# Patient Record
Sex: FEMALE | Race: WHITE | NOT HISPANIC OR LATINO
[De-identification: names, ages, dates, MRNs, and addresses within clinical notes are randomized per-mention and may not be internally consistent; named-entity substitution may affect disease eponyms.]

---

## 2023-05-31 PROBLEM — Z00.00 ENCOUNTER FOR PREVENTIVE HEALTH EXAMINATION: Status: ACTIVE | Noted: 2023-05-31

## 2023-06-02 ENCOUNTER — RESULT CHARGE (OUTPATIENT)
Age: 48
End: 2023-06-02

## 2023-06-02 ENCOUNTER — OUTPATIENT (OUTPATIENT)
Dept: OUTPATIENT SERVICES | Facility: HOSPITAL | Age: 48
LOS: 1 days | End: 2023-06-02
Payer: MEDICAID

## 2023-06-02 ENCOUNTER — APPOINTMENT (OUTPATIENT)
Dept: OBGYN | Facility: CLINIC | Age: 48
End: 2023-06-02
Payer: MEDICAID

## 2023-06-02 VITALS
HEART RATE: 94 BPM | WEIGHT: 195 LBS | SYSTOLIC BLOOD PRESSURE: 127 MMHG | HEIGHT: 66 IN | BODY MASS INDEX: 31.34 KG/M2 | DIASTOLIC BLOOD PRESSURE: 84 MMHG

## 2023-06-02 DIAGNOSIS — Z34.90 ENCOUNTER FOR SUPERVISION OF NORMAL PREGNANCY, UNSPECIFIED, UNSPECIFIED TRIMESTER: ICD-10-CM

## 2023-06-02 PROCEDURE — 99203 OFFICE O/P NEW LOW 30 MIN: CPT | Mod: 25

## 2023-06-02 PROCEDURE — 99203 OFFICE O/P NEW LOW 30 MIN: CPT

## 2023-06-02 PROCEDURE — 76815 OB US LIMITED FETUS(S): CPT

## 2023-06-02 PROCEDURE — 76815 OB US LIMITED FETUS(S): CPT | Mod: 26

## 2023-06-02 PROCEDURE — 81002 URINALYSIS NONAUTO W/O SCOPE: CPT

## 2023-06-02 RX ORDER — PRENATAL VIT NO.126/IRON/FOLIC 28MG-0.8MG
28-0.8 TABLET ORAL
Qty: 90 | Refills: 3 | Status: ACTIVE | COMMUNITY
Start: 2023-06-02 | End: 1900-01-01

## 2023-06-05 DIAGNOSIS — Z34.93 ENCOUNTER FOR SUPERVISION OF NORMAL PREGNANCY, UNSPECIFIED, THIRD TRIMESTER: ICD-10-CM

## 2023-06-06 LAB
BILIRUB UR QL STRIP: NEGATIVE
CLARITY UR: CLEAR
COLLECTION METHOD: NORMAL
GLUCOSE UR-MCNC: NEGATIVE
HCG UR QL: 0.2 EU/DL
HGB UR QL STRIP.AUTO: NEGATIVE
KETONES UR-MCNC: NEGATIVE
LEUKOCYTE ESTERASE UR QL STRIP: NEGATIVE
NITRITE UR QL STRIP: NEGATIVE
PH UR STRIP: 6
PROT UR STRIP-MCNC: NEGATIVE
SP GR UR STRIP: 1.02

## 2023-06-07 ENCOUNTER — APPOINTMENT (OUTPATIENT)
Dept: ANTEPARTUM | Facility: CLINIC | Age: 48
End: 2023-06-07
Payer: MEDICAID

## 2023-06-07 ENCOUNTER — OUTPATIENT (OUTPATIENT)
Dept: OUTPATIENT SERVICES | Facility: HOSPITAL | Age: 48
LOS: 1 days | End: 2023-06-07
Payer: MEDICAID

## 2023-06-07 ENCOUNTER — ASOB RESULT (OUTPATIENT)
Age: 48
End: 2023-06-07

## 2023-06-07 DIAGNOSIS — Z34.90 ENCOUNTER FOR SUPERVISION OF NORMAL PREGNANCY, UNSPECIFIED, UNSPECIFIED TRIMESTER: ICD-10-CM

## 2023-06-07 DIAGNOSIS — O09.93 SUPERVISION OF HIGH RISK PREGNANCY, UNSPECIFIED, THIRD TRIMESTER: ICD-10-CM

## 2023-06-07 PROCEDURE — 76819 FETAL BIOPHYS PROFIL W/O NST: CPT

## 2023-06-07 PROCEDURE — 76811 OB US DETAILED SNGL FETUS: CPT | Mod: 26

## 2023-06-07 PROCEDURE — 76819 FETAL BIOPHYS PROFIL W/O NST: CPT | Mod: 26

## 2023-06-07 PROCEDURE — 99212 OFFICE O/P EST SF 10 MIN: CPT | Mod: 25

## 2023-06-07 PROCEDURE — 76811 OB US DETAILED SNGL FETUS: CPT

## 2023-06-07 PROCEDURE — 99212 OFFICE O/P EST SF 10 MIN: CPT

## 2023-06-07 PROCEDURE — ZZZZZ: CPT

## 2023-06-08 ENCOUNTER — OUTPATIENT (OUTPATIENT)
Dept: OUTPATIENT SERVICES | Facility: HOSPITAL | Age: 48
LOS: 1 days | End: 2023-06-08
Payer: MEDICAID

## 2023-06-08 DIAGNOSIS — O09.523 SUPERVISION OF ELDERLY MULTIGRAVIDA, THIRD TRIMESTER: ICD-10-CM

## 2023-06-08 DIAGNOSIS — O28.9 UNSPECIFIED ABNORMAL FINDINGS ON ANTENATAL SCREENING OF MOTHER: ICD-10-CM

## 2023-06-08 DIAGNOSIS — O99.213 OBESITY COMPLICATING PREGNANCY, THIRD TRIMESTER: ICD-10-CM

## 2023-06-08 DIAGNOSIS — Z3A.29 29 WEEKS GESTATION OF PREGNANCY: ICD-10-CM

## 2023-06-08 DIAGNOSIS — O35.8XX0 MATERNAL CARE FOR OTHER (SUSPECTED) FETAL ABNORMALITY AND DAMAGE, NOT APPLICABLE OR UNSPECIFIED: ICD-10-CM

## 2023-06-08 DIAGNOSIS — Z34.93 ENCOUNTER FOR SUPERVISION OF NORMAL PREGNANCY, UNSPECIFIED, THIRD TRIMESTER: ICD-10-CM

## 2023-06-08 DIAGNOSIS — O09.93 SUPERVISION OF HIGH RISK PREGNANCY, UNSPECIFIED, THIRD TRIMESTER: ICD-10-CM

## 2023-06-08 DIAGNOSIS — O28.5 ABNORMAL CHROMOSOMAL AND GENETIC FINDING ON ANTENATAL SCREENING OF MOTHER: ICD-10-CM

## 2023-06-08 DIAGNOSIS — O99.210 OBESITY COMPLICATING PREGNANCY, UNSPECIFIED TRIMESTER: ICD-10-CM

## 2023-06-08 PROCEDURE — 85027 COMPLETE CBC AUTOMATED: CPT

## 2023-06-08 PROCEDURE — 36415 COLL VENOUS BLD VENIPUNCTURE: CPT

## 2023-06-08 PROCEDURE — 86900 BLOOD TYPING SEROLOGIC ABO: CPT

## 2023-06-08 PROCEDURE — 86901 BLOOD TYPING SEROLOGIC RH(D): CPT

## 2023-06-08 PROCEDURE — 86850 RBC ANTIBODY SCREEN: CPT

## 2023-06-08 PROCEDURE — 82950 GLUCOSE TEST: CPT

## 2023-06-09 ENCOUNTER — NON-APPOINTMENT (OUTPATIENT)
Age: 48
End: 2023-06-09

## 2023-06-09 DIAGNOSIS — Z34.93 ENCOUNTER FOR SUPERVISION OF NORMAL PREGNANCY, UNSPECIFIED, THIRD TRIMESTER: ICD-10-CM

## 2023-06-13 ENCOUNTER — NON-APPOINTMENT (OUTPATIENT)
Age: 48
End: 2023-06-13

## 2023-06-13 LAB
ABO + RH PNL BLD: NORMAL
BLD GP AB SCN SERPL QL: NORMAL

## 2023-06-14 ENCOUNTER — NON-APPOINTMENT (OUTPATIENT)
Age: 48
End: 2023-06-14

## 2023-06-14 LAB — GLUCOSE 1H P 50 G GLC PO SERPL-MCNC: 179 MG/DL

## 2023-06-16 ENCOUNTER — OUTPATIENT (OUTPATIENT)
Dept: OUTPATIENT SERVICES | Facility: HOSPITAL | Age: 48
LOS: 1 days | End: 2023-06-16
Payer: MEDICAID

## 2023-06-16 ENCOUNTER — APPOINTMENT (OUTPATIENT)
Dept: OBGYN | Facility: CLINIC | Age: 48
End: 2023-06-16
Payer: MEDICAID

## 2023-06-16 VITALS — BODY MASS INDEX: 33.41 KG/M2 | WEIGHT: 207 LBS | DIASTOLIC BLOOD PRESSURE: 70 MMHG | SYSTOLIC BLOOD PRESSURE: 124 MMHG

## 2023-06-16 DIAGNOSIS — Z34.90 ENCOUNTER FOR SUPERVISION OF NORMAL PREGNANCY, UNSPECIFIED, UNSPECIFIED TRIMESTER: ICD-10-CM

## 2023-06-16 PROCEDURE — 99213 OFFICE O/P EST LOW 20 MIN: CPT

## 2023-06-16 PROCEDURE — 81002 URINALYSIS NONAUTO W/O SCOPE: CPT

## 2023-06-21 DIAGNOSIS — Z34.93 ENCOUNTER FOR SUPERVISION OF NORMAL PREGNANCY, UNSPECIFIED, THIRD TRIMESTER: ICD-10-CM

## 2023-06-24 ENCOUNTER — OUTPATIENT (OUTPATIENT)
Dept: OUTPATIENT SERVICES | Facility: HOSPITAL | Age: 48
LOS: 1 days | End: 2023-06-24
Payer: MEDICAID

## 2023-06-24 DIAGNOSIS — Z34.93 ENCOUNTER FOR SUPERVISION OF NORMAL PREGNANCY, UNSPECIFIED, THIRD TRIMESTER: ICD-10-CM

## 2023-06-24 PROCEDURE — 82652 VIT D 1 25-DIHYDROXY: CPT

## 2023-06-24 PROCEDURE — 36415 COLL VENOUS BLD VENIPUNCTURE: CPT

## 2023-06-25 DIAGNOSIS — Z34.93 ENCOUNTER FOR SUPERVISION OF NORMAL PREGNANCY, UNSPECIFIED, THIRD TRIMESTER: ICD-10-CM

## 2023-06-29 ENCOUNTER — APPOINTMENT (OUTPATIENT)
Dept: ANTEPARTUM | Facility: CLINIC | Age: 48
End: 2023-06-29

## 2023-06-30 ENCOUNTER — NON-APPOINTMENT (OUTPATIENT)
Age: 48
End: 2023-06-30

## 2023-06-30 ENCOUNTER — APPOINTMENT (OUTPATIENT)
Dept: ANTEPARTUM | Facility: CLINIC | Age: 48
End: 2023-06-30
Payer: COMMERCIAL

## 2023-06-30 ENCOUNTER — OUTPATIENT (OUTPATIENT)
Dept: OUTPATIENT SERVICES | Facility: HOSPITAL | Age: 48
LOS: 1 days | End: 2023-06-30
Payer: COMMERCIAL

## 2023-06-30 ENCOUNTER — APPOINTMENT (OUTPATIENT)
Dept: OBGYN | Facility: CLINIC | Age: 48
End: 2023-06-30
Payer: COMMERCIAL

## 2023-06-30 ENCOUNTER — RESULT CHARGE (OUTPATIENT)
Age: 48
End: 2023-06-30

## 2023-06-30 VITALS
HEART RATE: 91 BPM | SYSTOLIC BLOOD PRESSURE: 134 MMHG | WEIGHT: 208 LBS | HEIGHT: 66 IN | BODY MASS INDEX: 33.43 KG/M2 | DIASTOLIC BLOOD PRESSURE: 76 MMHG

## 2023-06-30 DIAGNOSIS — Z34.90 ENCOUNTER FOR SUPERVISION OF NORMAL PREGNANCY, UNSPECIFIED, UNSPECIFIED TRIMESTER: ICD-10-CM

## 2023-06-30 DIAGNOSIS — Z34.93 ENCOUNTER FOR SUPERVISION OF NORMAL PREGNANCY, UNSPECIFIED, THIRD TRIMESTER: ICD-10-CM

## 2023-06-30 PROCEDURE — 57454 BX/CURETT OF CERVIX W/SCOPE: CPT

## 2023-06-30 PROCEDURE — 87086 URINE CULTURE/COLONY COUNT: CPT

## 2023-06-30 PROCEDURE — 99213 OFFICE O/P EST LOW 20 MIN: CPT

## 2023-06-30 PROCEDURE — 81002 URINALYSIS NONAUTO W/O SCOPE: CPT

## 2023-06-30 PROCEDURE — 81025 URINE PREGNANCY TEST: CPT

## 2023-06-30 RX ORDER — CEPHALEXIN 500 MG/1
500 CAPSULE ORAL
Qty: 14 | Refills: 0 | Status: ACTIVE | COMMUNITY
Start: 2023-06-30 | End: 1900-01-01

## 2023-06-30 RX ORDER — LANCETS 33 GAUGE
EACH MISCELLANEOUS
Qty: 120 | Refills: 4 | Status: ACTIVE | COMMUNITY
Start: 2023-06-30 | End: 1900-01-01

## 2023-06-30 RX ORDER — ISOPROPYL ALCOHOL 70 ML/100ML
SWAB TOPICAL
Qty: 200 | Refills: 3 | Status: ACTIVE | COMMUNITY
Start: 2023-06-30 | End: 1900-01-01

## 2023-06-30 RX ORDER — BLOOD-GLUCOSE METER
W/DEVICE EACH MISCELLANEOUS
Qty: 1 | Refills: 0 | Status: ACTIVE | COMMUNITY
Start: 2023-06-30 | End: 1900-01-01

## 2023-06-30 RX ORDER — BLOOD SUGAR DIAGNOSTIC
STRIP MISCELLANEOUS
Qty: 120 | Refills: 2 | Status: ACTIVE | COMMUNITY
Start: 2023-06-30 | End: 1900-01-01

## 2023-07-01 LAB
24R-OH-CALCIDIOL SERPL-MCNC: 114 PG/ML
BILIRUB UR QL STRIP: NEGATIVE
CLARITY UR: CLEAR
COLLECTION METHOD: NORMAL
GLUCOSE UR-MCNC: NEGATIVE
HCG UR QL: 0.2 EU/DL
HGB UR QL STRIP.AUTO: NEGATIVE
KETONES UR-MCNC: NEGATIVE
LEUKOCYTE ESTERASE UR QL STRIP: NEGATIVE
NITRITE UR QL STRIP: NEGATIVE
PH UR STRIP: 6
PROT UR STRIP-MCNC: NEGATIVE
SP GR UR STRIP: 1.02

## 2023-07-03 ENCOUNTER — OUTPATIENT (OUTPATIENT)
Dept: OUTPATIENT SERVICES | Facility: HOSPITAL | Age: 48
LOS: 1 days | End: 2023-07-03
Payer: MEDICAID

## 2023-07-03 ENCOUNTER — RESULT CHARGE (OUTPATIENT)
Age: 48
End: 2023-07-03

## 2023-07-03 ENCOUNTER — APPOINTMENT (OUTPATIENT)
Dept: ANTEPARTUM | Facility: CLINIC | Age: 48
End: 2023-07-03

## 2023-07-03 ENCOUNTER — APPOINTMENT (OUTPATIENT)
Dept: ANTEPARTUM | Facility: CLINIC | Age: 48
End: 2023-07-03
Payer: MEDICAID

## 2023-07-03 ENCOUNTER — NON-APPOINTMENT (OUTPATIENT)
Age: 48
End: 2023-07-03

## 2023-07-03 ENCOUNTER — ASOB RESULT (OUTPATIENT)
Age: 48
End: 2023-07-03

## 2023-07-03 ENCOUNTER — APPOINTMENT (OUTPATIENT)
Dept: OBGYN | Facility: CLINIC | Age: 48
End: 2023-07-03

## 2023-07-03 VITALS
DIASTOLIC BLOOD PRESSURE: 92 MMHG | BODY MASS INDEX: 33.57 KG/M2 | SYSTOLIC BLOOD PRESSURE: 140 MMHG | OXYGEN SATURATION: 96 % | TEMPERATURE: 98 F | HEART RATE: 90 BPM | WEIGHT: 208 LBS

## 2023-07-03 VITALS — SYSTOLIC BLOOD PRESSURE: 137 MMHG | DIASTOLIC BLOOD PRESSURE: 83 MMHG | HEART RATE: 84 BPM | OXYGEN SATURATION: 97 %

## 2023-07-03 DIAGNOSIS — Z34.90 ENCOUNTER FOR SUPERVISION OF NORMAL PREGNANCY, UNSPECIFIED, UNSPECIFIED TRIMESTER: ICD-10-CM

## 2023-07-03 DIAGNOSIS — O28.0 ABNORMAL HEMATOLOGICAL FINDING ON ANTENATAL SCREENING OF MOTHER: ICD-10-CM

## 2023-07-03 DIAGNOSIS — R89.4 ABNORMAL IMMUNOLOGICAL FINDINGS IN SPECIMENS FROM OTHER ORGANS, SYSTEMS AND TISSUES: ICD-10-CM

## 2023-07-03 DIAGNOSIS — Z3A.32 32 WEEKS GESTATION OF PREGNANCY: ICD-10-CM

## 2023-07-03 DIAGNOSIS — O09.90 SUPERVISION OF HIGH RISK PREGNANCY, UNSPECIFIED, UNSPECIFIED TRIMESTER: ICD-10-CM

## 2023-07-03 DIAGNOSIS — O24.419 GESTATIONAL DIABETES MELLITUS IN PREGNANCY, UNSPECIFIED CONTROL: ICD-10-CM

## 2023-07-03 DIAGNOSIS — O09.93 SUPERVISION OF HIGH RISK PREGNANCY, UNSPECIFIED, THIRD TRIMESTER: ICD-10-CM

## 2023-07-03 LAB
BILIRUB UR QL STRIP: NEGATIVE
CLARITY UR: CLEAR
COLLECTION METHOD: NORMAL
FETAL HEART DESCRIPTION: NORMAL
FETAL HEART DESCRIPTION: NORMAL
FETAL HEART RATE (BPM): 129
FETAL HEART RATE (BPM): 140
FETAL HEART RATE (BPM): 166
FETAL MOVEMENT: PRESENT
GLUCOSE BLDC GLUCOMTR-MCNC: 104
GLUCOSE BLDC GLUCOMTR-MCNC: 104 MG/DL — HIGH (ref 70–99)
GLUCOSE UR-MCNC: NEGATIVE
HCG UR QL: 0.2 EU/DL
HGB UR QL STRIP.AUTO: NEGATIVE
KETONES UR-MCNC: NEGATIVE
LEUKOCYTE ESTERASE UR QL STRIP: NEGATIVE
NITRITE UR QL STRIP: NEGATIVE
OB COMMENTS: NORMAL
PH UR STRIP: 5
PROT UR STRIP-MCNC: NEGATIVE
SCHEDULED VISIT: YES
SP GR UR STRIP: 1.01
URINE ALBUMIN/PROTEIN: NEGATIVE
URINE GLUCOSE: NEGATIVE
URINE KETONES: NEGATIVE
WEEKS GESTATION: 32.6

## 2023-07-03 PROCEDURE — 76816 OB US FOLLOW-UP PER FETUS: CPT | Mod: 26

## 2023-07-03 PROCEDURE — 76816 OB US FOLLOW-UP PER FETUS: CPT

## 2023-07-03 PROCEDURE — 76819 FETAL BIOPHYS PROFIL W/O NST: CPT | Mod: 26,59

## 2023-07-03 PROCEDURE — 99204 OFFICE O/P NEW MOD 45 MIN: CPT

## 2023-07-03 PROCEDURE — 76819 FETAL BIOPHYS PROFIL W/O NST: CPT

## 2023-07-03 PROCEDURE — G0108: CPT

## 2023-07-03 PROCEDURE — 99214 OFFICE O/P EST MOD 30 MIN: CPT | Mod: 25

## 2023-07-03 PROCEDURE — 82962 GLUCOSE BLOOD TEST: CPT

## 2023-07-03 NOTE — DISCUSSION/SUMMARY
[FreeTextEntry1] : [facility] Staff\par \par Ms. Amador is a 47yo  at 32w6d GA LEBRON 23 by sonogram presenting for consultation for GDM, AMA with elevated msAFP. 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 Anti-M antibodies noted in records from 3/2023, negative antibody screen on repeat. She originally received prenatal care in [place].  Currently managed by Dr. Lester.\par \par Patient is doing well. She denies contractions, leakage of fluid, vaginal bleeding. Endorses good fetal movement. \par Reports she has never had diabetes inside or outside of pregnancy before but she feels as though her diet has not been as good this pregnancy.  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 Has kittens that live in the basement but do not come upstairs or anywhere near her. denies, smoking, alcohol or drug use\par Psych: denies\par Genetics: denies h/o genetic abnormalities\par \par Physical Exam: BP: 140/92-->137/83 , HR: 90 bpm, O2sat: 96 %, Wt: 208 lbs, Temp: 98 F,\par Udip: negative \par GA: AOx3, NAD\par Heart: RRR, no M/R/G\par Lungs: CTAB\par Abd: soft, nontender, nondistended\par LE: no erythema, edema or pain\par \par Labs: NIPT low risk, msAFP 3 MoM\par  80/206/182/64\par  B positive, antibody screen negative\par  \par 3/8 VDRL negative, HepBsAg NR, varicella immune, rubella immune, carrier of usher syndrome, msAFP 3 MoM, B positive, postivie antibody screen anti-M, HIV NR, high avidity CMV IgG\par \par OBUS\par  29w1d 1497gm EFW 70%ile, MVP 7.08cm, vtx, left lateral placenta no previa, central cord insertion, BPP , normal anatomy\par \par A/P Ms. Amador is a 47yo  at 32w6d GA LEBRON 23 by sonogram presenting for consultation for GDM, AMA with elevated msAFP. Currently managed by Dr. Lester.\par \par #AMA, elevated msAFP at 3 MoM\par - normal anatomy scan\par - previously declined amniocentesis\par \par #GDM\par Counseling:  \par 1. The patient will be evaluated by a nutritionist and instructed in adhering to a diabetic diet. \par 2. She was counseled by a nurse and instructed in capillary blood glucose testing (fasting and 2 hours after each meal). \par 3. The significance and usual management of diabetes in pregnancy were reviewed, including risks of preeclampsia, Intra-Uterine Fetal Demise, macrosomia, birth trauma, pre-term labor,  section, NICU admission (the main reasons include  hypoglycemia and  jaundice) and the possible need for insulin therapy. \par 4. Exercise was encouraged. Ideally, she should walk briskly after each meal. \par \par Recommendations: \par 1. Glycemic Control. Target glucose ranges to minimize excessive fetal growth are: Fasting 60-90 mg/dl; and 2-hour postprandial < 120 mg/dl. If these values are elevated, insulin therapy is encouraged. Patient encouraged to maintain fingerstick log. \par 2. Antepartum testing. Daily fetal movement counts are recommended from 28 weeks. Weekly or twice weekly biophysical profiles may be recommended, the frequency and timing will depend on glucose control. Ultrasound assessment of fetal growth and macrosomic features is recommended. \par 3. Timing of Delivery. To be determined \par 4. Route of delivery. Plan for .   Diabetes is associated with about a six-fold risk for shoulder dystocia (which includes the risk of irreversible nerve, bone, and brain injury).  Operative vaginal deliveries should be approached with caution if at all.\par 5. Glucose control in labor. During labor, capillary glucose values should be checked every few hours (depending on their stability). Insulin may be required to cover elevated glucose levels. \par 6. Long-term diabetes surveillance. The risk of developing diabetes outside of pregnancy over the next five years may be as high as 50%. I recommend that she have a 2 hour GTT or similar diabetes screen at 6 to 12 weeks postpartum with her primary Obstetrician and counseling about ways to minimize her risk. If her fasting glucose is normal, annual glucose screening by her primary care physician is advised. \par \par - weekly BPP and monthly growth scans for the time being.\par \par Diabetes supplies previously sent to pharmacy. \par \par #Anti-M Antibody\par - Negative antibody screen , \par \par #Usher Syndrome Carrier\par - Patient has 4 healthy living children with same partner, amenable to genetic testing but reports  has not been available during pregnancy due to frequent travel\par - Amenable to genetic counselling, advised to schedule an appointment\par \par #Elevated BP in Office\par - Patient denies history of elevated BPs, currently asymptomatic, repeat non-elevated\par - Advised patient to monitor blood pressures at home and to return with BP log\par \par #Pregnancy \par - Care per Dr. Lester\par - Lives at home with cats, declines testing for toxoplasma.\par \par Hypoglycemia, fetal movement, and labor precautions discussed,\par Follow up in around one week with the fingerstick log  and the blood pressure log.\par \par \par  declines

## 2023-07-03 NOTE — END OF VISIT
[FreeTextEntry3] : [facility] Staff\par \par I saw and evaluated Ms. SAMPSON with Dr. Ta and I agree with the documentation above.   I modified the note above (if indicated) and agree with its contents in the present form.\par \par GDM\par - Check fingersticks\par \par Elevated blood pressure once\par - Record blood pressure log\par \par Carrier for Usher syndrome\par - Follow up with genetic counseling.\par \par Follow up in one week.\par \par Liu Kwon MD\par \par \par \par \par

## 2023-07-05 DIAGNOSIS — R89.4 ABNORMAL IMMUNOLOGICAL FINDINGS IN SPECIMENS FROM OTHER ORGANS, SYSTEMS AND TISSUES: ICD-10-CM

## 2023-07-05 DIAGNOSIS — Z3A.32 32 WEEKS GESTATION OF PREGNANCY: ICD-10-CM

## 2023-07-05 DIAGNOSIS — O24.419 GESTATIONAL DIABETES MELLITUS IN PREGNANCY, UNSPECIFIED CONTROL: ICD-10-CM

## 2023-07-05 DIAGNOSIS — O28.0 ABNORMAL HEMATOLOGICAL FINDING ON ANTENATAL SCREENING OF MOTHER: ICD-10-CM

## 2023-07-05 LAB — BACTERIA UR CULT: NORMAL

## 2023-07-07 ENCOUNTER — NON-APPOINTMENT (OUTPATIENT)
Age: 48
End: 2023-07-07

## 2023-07-11 ENCOUNTER — APPOINTMENT (OUTPATIENT)
Dept: ANTEPARTUM | Facility: CLINIC | Age: 48
End: 2023-07-11
Payer: MEDICAID

## 2023-07-11 ENCOUNTER — APPOINTMENT (OUTPATIENT)
Dept: OBGYN | Facility: CLINIC | Age: 48
End: 2023-07-11
Payer: MEDICAID

## 2023-07-11 ENCOUNTER — RESULT CHARGE (OUTPATIENT)
Age: 48
End: 2023-07-11

## 2023-07-11 ENCOUNTER — OUTPATIENT (OUTPATIENT)
Dept: OUTPATIENT SERVICES | Facility: HOSPITAL | Age: 48
LOS: 1 days | End: 2023-07-11
Payer: MEDICAID

## 2023-07-11 VITALS — BODY MASS INDEX: 33.43 KG/M2 | HEIGHT: 66 IN | WEIGHT: 208 LBS

## 2023-07-11 VITALS
BODY MASS INDEX: 33.57 KG/M2 | HEART RATE: 80 BPM | OXYGEN SATURATION: 97 % | SYSTOLIC BLOOD PRESSURE: 109 MMHG | WEIGHT: 208 LBS | DIASTOLIC BLOOD PRESSURE: 69 MMHG

## 2023-07-11 DIAGNOSIS — O09.90 SUPERVISION OF HIGH RISK PREGNANCY, UNSPECIFIED, UNSPECIFIED TRIMESTER: ICD-10-CM

## 2023-07-11 DIAGNOSIS — Z00.00 ENCOUNTER FOR GENERAL ADULT MEDICAL EXAMINATION WITHOUT ABNORMAL FINDINGS: ICD-10-CM

## 2023-07-11 LAB — GLUCOSE BLDC GLUCOMTR-MCNC: 91 MG/DL — SIGNIFICANT CHANGE UP (ref 70–99)

## 2023-07-11 PROCEDURE — 82962 GLUCOSE BLOOD TEST: CPT

## 2023-07-11 PROCEDURE — 99213 OFFICE O/P EST LOW 20 MIN: CPT

## 2023-07-11 NOTE — DISCUSSION/SUMMARY
[FreeTextEntry1] : Ms. Amador is a 49yo  at 34w0d GA LEBRON 23 by sonogram presenting for consultation for GDM, AMA with elevated msAFP. 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 Anti-M antibodies noted in records from 3/2023, negative antibody screen on repeat. She originally received prenatal care in [place]. Currently managed by Dr. Lester.\par Patient is doing well. She denies contractions, leakage of fluid, vaginal bleeding. Endorses good fetal movement. \par Reports significant changes in diet since diagnosis of diabetes. She has cut out gluten from her diet and is eating a lot of cheese, meat, and salad. 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 Has kittens that live in the basement but do not come upstairs or anywhere near her. denies, smoking, alcohol or drug use.\par Psych: denies\par Genetics: denies h/o genetic abnormalities\par \par Physical Exam: BP: 109/69 , HR: 80 bpm, O2sat: 97 %, Wt: 208 lbs, Temp: 97 F,\par Udip: mod ketones\par GA: AOx3, NAD\par Heart: RRR, no M/R/G\par Lungs: CTAB\par Abd: soft, nontender, nondistended\par LE: no erythema, edema or pain. small varicose veins on right calf, nontender to touch.\par \par Labs: NIPT low risk, msAFP 3 MoM\par  80/206/182/64\par  B positive, antibody screen negative\par  \par 3/8 VDRL negative, HepBsAg NR, varicella immune, rubella immune, carrier of usher syndrome, msAFP 3 MoM, B positive, postivie antibody screen anti-M, HIV NR, high avidity CMV IgG\par \par OBUS\par  29w1d 1497gm EFW 70%ile, MVP 7.08cm, vtx, left lateral placenta no previa, central cord insertion, BPP 8, normal anatomy\par 7/3 32w6d vtx, posterior placenta, MVP 8.21cm , BPP 10/10, EFW 2362gm 80%ile\par \par A/P Ms. Amador is a 49yo  at 34w0d GA LEBRON 23 by sonogram presenting for consultation for GDM, AMA with elevated msAFP. Currently managed by Dr. Lester.\par \par #AMA, elevated msAFP at 3 MoM\par - normal anatomy scan\par - previously declined amniocentesis\par \par #GDMA1, mild polyhydramnios MVP 8.2cm\par FS log: FFS  (1 values elevated), 2hr PPFS  (0 elevated)\par - Patient is s/p nutritionist evaluation\par - Previously discussed implications of GDM on pregnancy\par - Timing of delivery TBD\par - Mode of delivery - anticipate vaginal delivery\par - Antepartum testing: weekly BPP/NST starting at 36 weeks\par \par #Anti-M Antibody\par - Negative antibody screen , \par \par #Usher Syndrome Carrier\par - Patient has 4 healthy living children with same partner, amenable to genetic testing but reports  has not been available during pregnancy due to frequent travel\par - Declines genetic counsellor at this time\par \par #Elevated BP in office during previous visit\par - Patient denies history of elevated BPs\par - Forgot BP log at home but reports BPs have all been normal. \par - Encouraged to continue to take daily BPs and bring log next visit.\par - Preeclamptic precautions given. \par \par #Pregnancy \par - Care per Dr. Bahouth\par - Lives at home with cats, declines testing for toxoplasma.\par - Toxoplasmosis precautions given. \par \par Follow-up in 2 weeks in high risk clinic. \par

## 2023-07-12 DIAGNOSIS — O24.419 GESTATIONAL DIABETES MELLITUS IN PREGNANCY, UNSPECIFIED CONTROL: ICD-10-CM

## 2023-07-13 ENCOUNTER — APPOINTMENT (OUTPATIENT)
Dept: OBGYN | Facility: CLINIC | Age: 48
End: 2023-07-13

## 2023-07-18 LAB
BILIRUB UR QL STRIP: NORMAL
BP DIAS: 69 MM HG
BP SYS: 109 MM HG
CLARITY UR: CLEAR
COLLECTION METHOD: NORMAL
FETAL HEART RATE (BPM): 139
FETAL MOVEMENT: PRESENT
GLUCOSE BLDC GLUCOMTR-MCNC: 91
GLUCOSE UR-MCNC: NORMAL
HCG UR QL: 0.2 EU/DL
HGB UR QL STRIP.AUTO: NORMAL
KETONES UR-MCNC: NORMAL
LEUKOCYTE ESTERASE UR QL STRIP: NORMAL
NITRITE UR QL STRIP: NORMAL
OB COMMENTS: NORMAL
PH UR STRIP: 5
PROT UR STRIP-MCNC: NORMAL
SCHEDULED VISIT: YES
SP GR UR STRIP: 1.01
URINE ALBUMIN/PROTEIN: NORMAL
URINE GLUCOSE: NORMAL
URINE KETONES: NORMAL
WEEKS GESTATION: 34

## 2023-07-21 ENCOUNTER — NON-APPOINTMENT (OUTPATIENT)
Age: 48
End: 2023-07-21

## 2023-07-24 ENCOUNTER — NON-APPOINTMENT (OUTPATIENT)
Age: 48
End: 2023-07-24

## 2023-07-25 ENCOUNTER — OUTPATIENT (OUTPATIENT)
Dept: OUTPATIENT SERVICES | Facility: HOSPITAL | Age: 48
LOS: 1 days | End: 2023-07-25
Payer: MEDICAID

## 2023-07-25 ENCOUNTER — ASOB RESULT (OUTPATIENT)
Age: 48
End: 2023-07-25

## 2023-07-25 ENCOUNTER — APPOINTMENT (OUTPATIENT)
Dept: ANTEPARTUM | Facility: CLINIC | Age: 48
End: 2023-07-25
Payer: MEDICAID

## 2023-07-25 ENCOUNTER — RESULT CHARGE (OUTPATIENT)
Age: 48
End: 2023-07-25

## 2023-07-25 VITALS — DIASTOLIC BLOOD PRESSURE: 87 MMHG | SYSTOLIC BLOOD PRESSURE: 137 MMHG

## 2023-07-25 VITALS — WEIGHT: 211 LBS | BODY MASS INDEX: 34.06 KG/M2 | HEART RATE: 103 BPM | OXYGEN SATURATION: 97 % | TEMPERATURE: 98.2 F

## 2023-07-25 DIAGNOSIS — O28.5 ABNORMAL CHROMOSOMAL AND GENETIC FINDING ON ANTENATAL SCREENING OF MOTHER: ICD-10-CM

## 2023-07-25 DIAGNOSIS — Z3A.34 34 WEEKS GESTATION OF PREGNANCY: ICD-10-CM

## 2023-07-25 DIAGNOSIS — Z34.90 ENCOUNTER FOR SUPERVISION OF NORMAL PREGNANCY, UNSPECIFIED, UNSPECIFIED TRIMESTER: ICD-10-CM

## 2023-07-25 DIAGNOSIS — O24.410 GESTATIONAL DIABETES MELLITUS IN PREGNANCY, DIET CONTROLLED: ICD-10-CM

## 2023-07-25 DIAGNOSIS — O28.0 ABNORMAL HEMATOLOGICAL FINDING ON ANTENATAL SCREENING OF MOTHER: ICD-10-CM

## 2023-07-25 DIAGNOSIS — O09.529 SUPERVISION OF ELDERLY MULTIGRAVIDA, UNSPECIFIED TRIMESTER: ICD-10-CM

## 2023-07-25 DIAGNOSIS — O09.90 SUPERVISION OF HIGH RISK PREGNANCY, UNSPECIFIED, UNSPECIFIED TRIMESTER: ICD-10-CM

## 2023-07-25 LAB — GLUCOSE BLDC GLUCOMTR-MCNC: 98 MG/DL — SIGNIFICANT CHANGE UP (ref 70–99)

## 2023-07-25 PROCEDURE — 76818 FETAL BIOPHYS PROFILE W/NST: CPT | Mod: 26

## 2023-07-25 PROCEDURE — 99213 OFFICE O/P EST LOW 20 MIN: CPT | Mod: 25

## 2023-07-25 PROCEDURE — 99213 OFFICE O/P EST LOW 20 MIN: CPT

## 2023-07-25 PROCEDURE — 81002 URINALYSIS NONAUTO W/O SCOPE: CPT

## 2023-07-25 PROCEDURE — 76818 FETAL BIOPHYS PROFILE W/NST: CPT

## 2023-07-25 PROCEDURE — 82962 GLUCOSE BLOOD TEST: CPT

## 2023-07-25 PROCEDURE — 82948 REAGENT STRIP/BLOOD GLUCOSE: CPT

## 2023-07-25 NOTE — DISCUSSION/SUMMARY
[FreeTextEntry1] : 23\par MFM Att'g and PGY-3 f/u consult Note:\par Ms. Amador is a 49yo  at 36w0d (LEBRON 23 by sonogram referred for consultation by Dr Lester for GDM, AMA with elevated msAFP. 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 Anti-M antibodies noted in records from 3/2023, negative antibody screen on repeat. She originally received prenatal care in [place]. Currently managed by Dr. Lester.\par Patient is doing well. She denies contractions, leakage of fluid, vaginal bleeding. Perceives fetal movement. \par Reports continued compliance with diabetic diet. 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 Has kittens that live in the basement but do not come upstairs or anywhere near her. denies, smoking, alcohol or drug use.\par Psych: denies\par Genetics: denies h/o genetic abnormalities\par \par Physical Exam: BP: 137/87 , HR: 103 bpm, O2sat: 100 %, Wt: 211 lbs\par Udip: neg\par GA: AOx3, NAD\par Heart: RRR, no M/R/G\par Lungs: CTAB\par Abd: soft, nontender, nondistended\par LE: no erythema, edema or pain. small varicose veins on right calf, nontender to touch.\par \par Labs: NIPT low risk, msAFP 3 MoM\par  80/206/182/64\par  B positive, antibody screen negative\par  \par 3/8 VDRL negative, HepBsAg NR, varicella immune, rubella immune, carrier of usher syndrome, msAFP 3 MoM, B positive, postivie antibody screen anti-M, HIV NR, high avidity CMV IgG\par \par OBUS\par  29w1d 1497gm EFW 70%ile, MVP 7.08cm, vtx, left lateral placenta no previa, central cord insertion, BPP , normal anatomy\par 7/3 32w6d vtx, posterior placenta, MVP 8.21cm , BPP 10/10, EFW 2362gm 80%ile\par  SFVtx, plac post, DVP 7.9cm, BPP 10/10. \par 23 Pending\par A/P Ms. Amador is a 49yo  at 36w0d GA LEBRON 23 by sonogram presenting for consultation for GDM, AMA with elevated msAFP. Currently managed by Dr. Lester.\par \par Impression/Plan: \par 1. Maternal Age 48, elevated msAFP at 3 MoM: Anatomy survey was complete and identified no anomalies. Declined amniocentesis.\par \par 2. GDMA1, mild polyhydramnios MVP 8.2cm > 7.9 today. Patient is s/p nutritionist evaluation.  We have previously discussed implications of GDM on pregnancy.\par FS log shows satisfactory control: FFS 81-93 (1/10 values elevated), 2hr PPFS  ( elevated)\par - Timing of delivery TBD\par - Mode of delivery - anticipate vaginal delivery\par - Antepartum testing: weekly BPP/NST starting at 36 weeks\par \par 2. Anti-M Antibody:  Negative antibody screen , \par \par 3. Usher Syndrome Carrier:  Patient has 4 healthy living children with same partner, amenable to genetic testing but reports  has not been available during pregnancy due to frequent travel.  She previously declined genetic counseling.\par \par 4. Elevated BP in office during previous visit, BP range from - /73-97 (2/10 elevated)\par - Preeclamptic precautions given and patient advised to present immediately to L&D for evaluation if BPs elevated\par -->Continue taking BPs daily\par \par 5. Pregnancy Care per Dr. Lester\par - Lives at home with cats, declines testing for toxoplasma.\par - Toxoplasmosis precautions given. \par \par RTC 1 week for visit and NST/BPP\par \par MD Debora, FACOG with MD Calvin, PGY-3. \par \par

## 2023-07-26 DIAGNOSIS — O09.523 SUPERVISION OF ELDERLY MULTIGRAVIDA, THIRD TRIMESTER: ICD-10-CM

## 2023-07-26 DIAGNOSIS — O24.419 GESTATIONAL DIABETES MELLITUS IN PREGNANCY, UNSPECIFIED CONTROL: ICD-10-CM

## 2023-07-26 DIAGNOSIS — O09.93 SUPERVISION OF HIGH RISK PREGNANCY, UNSPECIFIED, THIRD TRIMESTER: ICD-10-CM

## 2023-07-26 DIAGNOSIS — Z3A.36 36 WEEKS GESTATION OF PREGNANCY: ICD-10-CM

## 2023-07-26 DIAGNOSIS — O99.213 OBESITY COMPLICATING PREGNANCY, THIRD TRIMESTER: ICD-10-CM

## 2023-07-26 DIAGNOSIS — O28.9 UNSPECIFIED ABNORMAL FINDINGS ON ANTENATAL SCREENING OF MOTHER: ICD-10-CM

## 2023-07-28 ENCOUNTER — NON-APPOINTMENT (OUTPATIENT)
Age: 48
End: 2023-07-28

## 2023-08-01 ENCOUNTER — APPOINTMENT (OUTPATIENT)
Dept: OBGYN | Facility: CLINIC | Age: 48
End: 2023-08-01
Payer: MEDICAID

## 2023-08-01 ENCOUNTER — APPOINTMENT (OUTPATIENT)
Dept: ANTEPARTUM | Facility: CLINIC | Age: 48
End: 2023-08-01
Payer: MEDICAID

## 2023-08-01 ENCOUNTER — ASOB RESULT (OUTPATIENT)
Age: 48
End: 2023-08-01

## 2023-08-01 ENCOUNTER — RESULT CHARGE (OUTPATIENT)
Age: 48
End: 2023-08-01

## 2023-08-01 ENCOUNTER — NON-APPOINTMENT (OUTPATIENT)
Age: 48
End: 2023-08-01

## 2023-08-01 ENCOUNTER — INPATIENT (INPATIENT)
Facility: HOSPITAL | Age: 48
LOS: 1 days | Discharge: HOME | DRG: 560 | End: 2023-08-03
Attending: OBSTETRICS & GYNECOLOGY | Admitting: OBSTETRICS & GYNECOLOGY
Payer: MEDICAID

## 2023-08-01 ENCOUNTER — OUTPATIENT (OUTPATIENT)
Dept: OUTPATIENT SERVICES | Facility: HOSPITAL | Age: 48
LOS: 1 days | End: 2023-08-01
Payer: MEDICAID

## 2023-08-01 VITALS — SYSTOLIC BLOOD PRESSURE: 109 MMHG | HEART RATE: 73 BPM | DIASTOLIC BLOOD PRESSURE: 61 MMHG

## 2023-08-01 VITALS — SYSTOLIC BLOOD PRESSURE: 133 MMHG | BODY MASS INDEX: 34.06 KG/M2 | DIASTOLIC BLOOD PRESSURE: 84 MMHG | WEIGHT: 211 LBS

## 2023-08-01 VITALS — OXYGEN SATURATION: 98 % | HEART RATE: 97 BPM

## 2023-08-01 DIAGNOSIS — Z98.890 OTHER SPECIFIED POSTPROCEDURAL STATES: Chronic | ICD-10-CM

## 2023-08-01 DIAGNOSIS — Z34.93 ENCOUNTER FOR SUPERVISION OF NORMAL PREGNANCY, UNSPECIFIED, THIRD TRIMESTER: ICD-10-CM

## 2023-08-01 DIAGNOSIS — O09.90 SUPERVISION OF HIGH RISK PREGNANCY, UNSPECIFIED, UNSPECIFIED TRIMESTER: ICD-10-CM

## 2023-08-01 DIAGNOSIS — O26.893 OTHER SPECIFIED PREGNANCY RELATED CONDITIONS, THIRD TRIMESTER: ICD-10-CM

## 2023-08-01 DIAGNOSIS — Z34.90 ENCOUNTER FOR SUPERVISION OF NORMAL PREGNANCY, UNSPECIFIED, UNSPECIFIED TRIMESTER: ICD-10-CM

## 2023-08-01 LAB
ALBUMIN SERPL ELPH-MCNC: 3.7 G/DL — SIGNIFICANT CHANGE UP (ref 3.5–5.2)
ALP SERPL-CCNC: 115 U/L — SIGNIFICANT CHANGE UP (ref 30–115)
ALT FLD-CCNC: 8 U/L — SIGNIFICANT CHANGE UP (ref 0–41)
ANION GAP SERPL CALC-SCNC: 14 MMOL/L — SIGNIFICANT CHANGE UP (ref 7–14)
APPEARANCE UR: CLEAR — SIGNIFICANT CHANGE UP
APTT BLD: 28.9 SEC — SIGNIFICANT CHANGE UP (ref 27–39.2)
AST SERPL-CCNC: 10 U/L — SIGNIFICANT CHANGE UP (ref 0–41)
BASOPHILS # BLD AUTO: 0.02 K/UL — SIGNIFICANT CHANGE UP (ref 0–0.2)
BASOPHILS NFR BLD AUTO: 0.2 % — SIGNIFICANT CHANGE UP (ref 0–1)
BILIRUB SERPL-MCNC: <0.2 MG/DL — SIGNIFICANT CHANGE UP (ref 0.2–1.2)
BILIRUB UR-MCNC: NEGATIVE — SIGNIFICANT CHANGE UP
BUN SERPL-MCNC: 9 MG/DL — LOW (ref 10–20)
CALCIUM SERPL-MCNC: 9.4 MG/DL — SIGNIFICANT CHANGE UP (ref 8.4–10.5)
CHLORIDE SERPL-SCNC: 104 MMOL/L — SIGNIFICANT CHANGE UP (ref 98–110)
CO2 SERPL-SCNC: 20 MMOL/L — SIGNIFICANT CHANGE UP (ref 17–32)
COLOR SPEC: YELLOW — SIGNIFICANT CHANGE UP
CREAT SERPL-MCNC: 0.8 MG/DL — SIGNIFICANT CHANGE UP (ref 0.7–1.5)
DIFF PNL FLD: NEGATIVE — SIGNIFICANT CHANGE UP
EGFR: 92 ML/MIN/1.73M2 — SIGNIFICANT CHANGE UP
EOSINOPHIL # BLD AUTO: 0.09 K/UL — SIGNIFICANT CHANGE UP (ref 0–0.7)
EOSINOPHIL NFR BLD AUTO: 0.9 % — SIGNIFICANT CHANGE UP (ref 0–8)
FIBRINOGEN PPP-MCNC: >700 MG/DL — HIGH (ref 204.4–570.6)
GLUCOSE BLDC GLUCOMTR-MCNC: 109 MG/DL — HIGH (ref 70–99)
GLUCOSE BLDC GLUCOMTR-MCNC: 94 MG/DL — SIGNIFICANT CHANGE UP (ref 70–99)
GLUCOSE SERPL-MCNC: 101 MG/DL — HIGH (ref 70–99)
GLUCOSE UR QL: NEGATIVE MG/DL — SIGNIFICANT CHANGE UP
HCT VFR BLD CALC: 37.4 % — SIGNIFICANT CHANGE UP (ref 37–47)
HGB BLD-MCNC: 12.8 G/DL — SIGNIFICANT CHANGE UP (ref 12–16)
HIV 1 & 2 AB SERPL IA.RAPID: SIGNIFICANT CHANGE UP
IMM GRANULOCYTES NFR BLD AUTO: 0.5 % — HIGH (ref 0.1–0.3)
INR BLD: 0.89 RATIO — SIGNIFICANT CHANGE UP (ref 0.65–1.3)
KETONES UR-MCNC: NEGATIVE MG/DL — SIGNIFICANT CHANGE UP
LDH SERPL L TO P-CCNC: 143 — SIGNIFICANT CHANGE UP (ref 50–242)
LEUKOCYTE ESTERASE UR-ACNC: NEGATIVE — SIGNIFICANT CHANGE UP
LYMPHOCYTES # BLD AUTO: 2.15 K/UL — SIGNIFICANT CHANGE UP (ref 1.2–3.4)
LYMPHOCYTES # BLD AUTO: 22.5 % — SIGNIFICANT CHANGE UP (ref 20.5–51.1)
MCHC RBC-ENTMCNC: 31.6 PG — HIGH (ref 27–31)
MCHC RBC-ENTMCNC: 34.2 G/DL — SIGNIFICANT CHANGE UP (ref 32–37)
MCV RBC AUTO: 92.3 FL — SIGNIFICANT CHANGE UP (ref 81–99)
MONOCYTES # BLD AUTO: 0.89 K/UL — HIGH (ref 0.1–0.6)
MONOCYTES NFR BLD AUTO: 9.3 % — SIGNIFICANT CHANGE UP (ref 1.7–9.3)
NEUTROPHILS # BLD AUTO: 6.36 K/UL — SIGNIFICANT CHANGE UP (ref 1.4–6.5)
NEUTROPHILS NFR BLD AUTO: 66.6 % — SIGNIFICANT CHANGE UP (ref 42.2–75.2)
NITRITE UR-MCNC: NEGATIVE — SIGNIFICANT CHANGE UP
NRBC # BLD: 0 /100 WBCS — SIGNIFICANT CHANGE UP (ref 0–0)
PH UR: 6.5 — SIGNIFICANT CHANGE UP (ref 5–8)
PLATELET # BLD AUTO: 266 K/UL — SIGNIFICANT CHANGE UP (ref 130–400)
PMV BLD: 11.9 FL — HIGH (ref 7.4–10.4)
POTASSIUM SERPL-MCNC: 4.1 MMOL/L — SIGNIFICANT CHANGE UP (ref 3.5–5)
POTASSIUM SERPL-SCNC: 4.1 MMOL/L — SIGNIFICANT CHANGE UP (ref 3.5–5)
PRENATAL SYPHILIS TEST: SIGNIFICANT CHANGE UP
PROT SERPL-MCNC: 6.5 G/DL — SIGNIFICANT CHANGE UP (ref 6–8)
PROT UR-MCNC: NEGATIVE MG/DL — SIGNIFICANT CHANGE UP
PROTHROM AB SERPL-ACNC: 10.1 SEC — SIGNIFICANT CHANGE UP (ref 9.95–12.87)
RBC # BLD: 4.05 M/UL — LOW (ref 4.2–5.4)
RBC # FLD: 13.2 % — SIGNIFICANT CHANGE UP (ref 11.5–14.5)
SODIUM SERPL-SCNC: 138 MMOL/L — SIGNIFICANT CHANGE UP (ref 135–146)
SP GR SPEC: 1 — SIGNIFICANT CHANGE UP (ref 1–1.03)
URATE SERPL-MCNC: 5.6 MG/DL — SIGNIFICANT CHANGE UP (ref 2.5–7)
UROBILINOGEN FLD QL: 0.2 MG/DL — SIGNIFICANT CHANGE UP (ref 0.2–1)
WBC # BLD: 9.56 K/UL — SIGNIFICANT CHANGE UP (ref 4.8–10.8)
WBC # FLD AUTO: 9.56 K/UL — SIGNIFICANT CHANGE UP (ref 4.8–10.8)

## 2023-08-01 PROCEDURE — 76816 OB US FOLLOW-UP PER FETUS: CPT | Mod: 26

## 2023-08-01 PROCEDURE — 86870 RBC ANTIBODY IDENTIFICATION: CPT

## 2023-08-01 PROCEDURE — 84550 ASSAY OF BLOOD/URIC ACID: CPT

## 2023-08-01 PROCEDURE — 85730 THROMBOPLASTIN TIME PARTIAL: CPT

## 2023-08-01 PROCEDURE — 99213 OFFICE O/P EST LOW 20 MIN: CPT | Mod: 25

## 2023-08-01 PROCEDURE — 84156 ASSAY OF PROTEIN URINE: CPT

## 2023-08-01 PROCEDURE — 85025 COMPLETE CBC W/AUTO DIFF WBC: CPT

## 2023-08-01 PROCEDURE — 86592 SYPHILIS TEST NON-TREP QUAL: CPT

## 2023-08-01 PROCEDURE — 99213 OFFICE O/P EST LOW 20 MIN: CPT

## 2023-08-01 PROCEDURE — 59050 FETAL MONITOR W/REPORT: CPT

## 2023-08-01 PROCEDURE — 85384 FIBRINOGEN ACTIVITY: CPT

## 2023-08-01 PROCEDURE — 86901 BLOOD TYPING SEROLOGIC RH(D): CPT

## 2023-08-01 PROCEDURE — 80053 COMPREHEN METABOLIC PANEL: CPT

## 2023-08-01 PROCEDURE — 86077 PHYS BLOOD BANK SERV XMATCH: CPT

## 2023-08-01 PROCEDURE — 76818 FETAL BIOPHYS PROFILE W/NST: CPT | Mod: 26,59

## 2023-08-01 PROCEDURE — 80354 DRUG SCREENING FENTANYL: CPT

## 2023-08-01 PROCEDURE — 36415 COLL VENOUS BLD VENIPUNCTURE: CPT

## 2023-08-01 PROCEDURE — 81003 URINALYSIS AUTO W/O SCOPE: CPT

## 2023-08-01 PROCEDURE — 82962 GLUCOSE BLOOD TEST: CPT

## 2023-08-01 PROCEDURE — 86880 COOMBS TEST DIRECT: CPT

## 2023-08-01 PROCEDURE — 83615 LACTATE (LD) (LDH) ENZYME: CPT

## 2023-08-01 PROCEDURE — 86703 HIV-1/HIV-2 1 RESULT ANTBDY: CPT

## 2023-08-01 PROCEDURE — 85610 PROTHROMBIN TIME: CPT

## 2023-08-01 PROCEDURE — 80307 DRUG TEST PRSMV CHEM ANLYZR: CPT

## 2023-08-01 PROCEDURE — 86900 BLOOD TYPING SEROLOGIC ABO: CPT

## 2023-08-01 PROCEDURE — 82570 ASSAY OF URINE CREATININE: CPT

## 2023-08-01 PROCEDURE — 86850 RBC ANTIBODY SCREEN: CPT

## 2023-08-01 PROCEDURE — 86922 COMPATIBILITY TEST ANTIGLOB: CPT

## 2023-08-01 RX ORDER — OXYTOCIN 10 UNIT/ML
333.33 VIAL (ML) INJECTION
Qty: 20 | Refills: 0 | Status: DISCONTINUED | OUTPATIENT
Start: 2023-08-01 | End: 2023-08-03

## 2023-08-01 RX ORDER — SODIUM CHLORIDE 9 MG/ML
1000 INJECTION, SOLUTION INTRAVENOUS
Refills: 0 | Status: DISCONTINUED | OUTPATIENT
Start: 2023-08-01 | End: 2023-08-02

## 2023-08-01 RX ADMIN — SODIUM CHLORIDE 125 MILLILITER(S): 9 INJECTION, SOLUTION INTRAVENOUS at 21:51

## 2023-08-01 NOTE — OB PROVIDER H&P - NSHPPHYSICALEXAM_GEN_ALL_CORE
Vital Signs Last 24 Hrs  HR: 73 (08-01-23 @ 10:05) (73 - 73)  BP: 109/61 (08-01-23 @ 10:05) (109/61 - 109/61)      GA: AAOx3 in NAD  Cv: Normal S1S2  Pulm; CTAB  Neuro: 2+ DTR  abd: gravid, nontender  EFM: 135/mod variability/accels +  toco: no contractions  SVE: 2/0/-3, vertex

## 2023-08-01 NOTE — OB PROVIDER H&P - NS_OBGYNHISTORY_OBGYN_ALL_OB_FT
OB: FT  x4, uncomplicated, largest 7-14lbs, denies h/o PPH    GYN: denies h/o fibroids, cysts, abnormal paps, or STI's

## 2023-08-01 NOTE — OB PROVIDER H&P - ASSESSMENT
47 y/o  at 37w0d, GBS unknown, elevated MSAFP normal anatomy scan, Usher syndrome carrier, FOB unknown, declined amnio, IOL for gHTN and GDMA1, currently normotensive,  - admit to L&D  - admission labs  - cont efm/toco  - pain management prn  - clear liquid diet  - FS q4hrs in latent phase, q2hrs in active phase    Dr. Morales aware

## 2023-08-01 NOTE — OB PROVIDER H&P - ATTENDING COMMENTS
Patient for admission for IOL for GDM and gHTN per [facility] reccomendations. Category 1 tracing for admission and IOL.

## 2023-08-01 NOTE — OB RN PATIENT PROFILE - FUNCTIONAL ASSESSMENT - BASIC MOBILITY SCORE.
Spoke with the patient's son about her labs. Iron is normal and ferritin is elevated. Recommended stopping iron supplement as this may help with constipation.     Creatinine is up from hospitalization to 1.7 and potassium is 5.7. Ordering a stat repeat that he will obtain soon. If worse, may need to be evaluated at the ER. Will also refer to nephrology.     Barb Escobar MD    
24

## 2023-08-01 NOTE — OB PROVIDER H&P - HISTORY OF PRESENT ILLNESS
47 y/o  at 37w0d, LEBRON 2023, dated by first trimester sonogram, presents for induction of labor for gHTN. Pregnancy has also been complicated with GDMA1, FS well controlled. Pregnancy further complicated with elevated MSAFP with normal anatomy scan, declined amniocentesis. 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 Had and elevated titer 1:1 for anti M, last antibody screen in  was negative. Denies headaches, vision changes, SOB, chest pain, RUQ/epigastric pain. Denies contractions, LOF, or vaginal bleeding. Reports good fetal movement. GBS unknown.

## 2023-08-01 NOTE — DISCUSSION/SUMMARY
[FreeTextEntry1] : Ms. Amador is a 49yo  at 37w0d (LEBRON 23 by sonogram referred for consultation by Dr Lester for GDM, AMA with elevated msAFP, cHTN. Currently managed by Dr. Lester.  Patient is doing well. She denies contractions, leakage of fluid, vaginal bleeding. Reports good fetal movement. Reports continued compliance with diabetic diet.  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 Anti-M antibodies noted in records from 3/2023, negative antibody screen on repeat. She originally received prenatal care in [place].    OB Hx:   39w  3yy16gh female  39w  7lb6oz female  39w  7lb8oz male  39w  7lb4oz male Gyn Hx: denies abnormal pap smears, fibroids, cysts, STDs. 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 Has kittens that live in the basement but do not come upstairs or anywhere near her. denies, smoking, alcohol or drug use. Psych: denies Genetics: denies h/o genetic abnormalities  Physical Exam: BP: 109/71, HR: 97 bpm, O2sat: 98 %, Wt: 211 lbs, office FS 94 (2hrs pp) Udip: neg GA: AOx3, NAD Heart: RRR, no M/R/G Lungs: CTAB Abd: soft, nontender, nondistended LE: no erythema, edema or pain. small varicose veins on right calf, nontender to touch.  Labs: NIPT low risk, msAFP 3 MoM  80/206/182/62  B positive, antibody screen negative   3/8 VDRL negative, HepBsAg NR, varicella immune, rubella immune, carrier of usher syndrome, msAFP 3 MoM, B positive, postivie antibody screen anti-M, HIV NR, high avidity CMV IgG  OBUS  29w1d 1497gm EFW 70%ile, MVP 7.08cm, vtx, left lateral placenta no previa, central cord insertion, BPP 8/8, normal anatomy 7/3 32w6d vtx, posterior placenta, MVP 8.21cm , BPP 10/10, EFW 2362gm 80%ile  SFVtx, plac post, DVP 7.9cm, BPP 10/10. 23  37w vtx, posterior placenta, MVP 8.21cm , BPP 10/10, EFW 3206gm 68%ile  A/P Ms. Amador is a 49yo  at 37w0d GA LEBRON 23 by sonogram presenting for consultation for GDM.polyhydramnios, AMA with elevated msAFP,; BP elevated on home BP monitoring. Currently managed by Dr. Lester.  1. Maternal Age 48, elevated msAFP at 3 MoM: Anatomy survey was complete and identified no anomalies. Declined amniocentesis.  2. GDMA1, mild polyhydramnios noted @32 weeks. Patient is s/p nutritionist evaluation. We have previously discussed implications of GDM on pregnancy. FS log shows satisfactory control from : FFS 79-90 (0/7 elevated), PPFS  (0/18 elevated) - Timing of delivery advised 37 weeks - Mode of delivery - anticipate vaginal delivery - Antepartum testing: weekly BPP/NST starting at 36 weeks  2. Anti-M Antibody: Negative antibody screen ,  3. Usher Syndrome Carrier: Patient has 4 healthy living children with same partner, amenable to genetic testing but reports  has not been available during pregnancy due to frequent travel. She previously declined genetic counseling.  4. Elevated BP in office during previous visits, likely mild cHTN not requiring medications, but first visit was not until 28 weeks; cannot rule out gest HTN  - BPs from  123-135/66-95 (2/7 elevated) - Preeclamptic precautions reinforced today and patient advised to present immediately to L&D for evaluation if BPs elevated - Continue taking BPs daily - Delivery tonight due to elevated BP (diastolic 95 at home) r/o preeclampsia  5. Pregnancy Care per Dr. Lester - Lives at home with cats, declines testing for toxoplasma.

## 2023-08-01 NOTE — OB PROVIDER H&P - NSHPLABSRESULTS_GEN_ALL_CORE
GTT 80/206/182/64    Sonograms:  6/7: EFW 1497g (70%), left lateral, no previa, vertex, normal anatomy, however limited due to advanced GA  8/1: 37w0d, EFW 3206g (68%), AC 66%, vertex, MVP 8.21cm, post placenta

## 2023-08-02 DIAGNOSIS — Z3A.36 36 WEEKS GESTATION OF PREGNANCY: ICD-10-CM

## 2023-08-02 DIAGNOSIS — O28.0 ABNORMAL HEMATOLOGICAL FINDING ON ANTENATAL SCREENING OF MOTHER: ICD-10-CM

## 2023-08-02 DIAGNOSIS — Z3A.37 37 WEEKS GESTATION OF PREGNANCY: ICD-10-CM

## 2023-08-02 DIAGNOSIS — O28.1 ABNORMAL BIOCHEMICAL FINDING ON ANTENATAL SCREENING OF MOTHER: ICD-10-CM

## 2023-08-02 DIAGNOSIS — O40.3XX0 POLYHYDRAMNIOS, THIRD TRIMESTER, NOT APPLICABLE OR UNSPECIFIED: ICD-10-CM

## 2023-08-02 DIAGNOSIS — O09.523 SUPERVISION OF ELDERLY MULTIGRAVIDA, THIRD TRIMESTER: ICD-10-CM

## 2023-08-02 DIAGNOSIS — O24.410 GESTATIONAL DIABETES MELLITUS IN PREGNANCY, DIET CONTROLLED: ICD-10-CM

## 2023-08-02 DIAGNOSIS — O13.3 GESTATIONAL [PREGNANCY-INDUCED] HYPERTENSION WITHOUT SIGNIFICANT PROTEINURIA, THIRD TRIMESTER: ICD-10-CM

## 2023-08-02 DIAGNOSIS — O09.529 SUPERVISION OF ELDERLY MULTIGRAVIDA, UNSPECIFIED TRIMESTER: ICD-10-CM

## 2023-08-02 DIAGNOSIS — O99.213 OBESITY COMPLICATING PREGNANCY, THIRD TRIMESTER: ICD-10-CM

## 2023-08-02 DIAGNOSIS — Z34.83 ENCOUNTER FOR SUPERVISION OF OTHER NORMAL PREGNANCY, THIRD TRIMESTER: ICD-10-CM

## 2023-08-02 DIAGNOSIS — O09.33 SUPERVISION OF PREGNANCY WITH INSUFFICIENT ANTENATAL CARE, THIRD TRIMESTER: ICD-10-CM

## 2023-08-02 DIAGNOSIS — O09.93 SUPERVISION OF HIGH RISK PREGNANCY, UNSPECIFIED, THIRD TRIMESTER: ICD-10-CM

## 2023-08-02 DIAGNOSIS — O40.3XX9 POLYHYDRAMNIOS, THIRD TRIMESTER, OTHER FETUS: ICD-10-CM

## 2023-08-02 LAB
ALLERGY+IMMUNOLOGY DIAG STUDY NOTE: SIGNIFICANT CHANGE UP
AMPHET UR-MCNC: NEGATIVE — SIGNIFICANT CHANGE UP
BARBITURATES UR SCN-MCNC: NEGATIVE — SIGNIFICANT CHANGE UP
BENZODIAZ UR-MCNC: NEGATIVE — SIGNIFICANT CHANGE UP
BLD GP AB SCN SERPL QL: SIGNIFICANT CHANGE UP
BUPRENORPHINE SCREEN, URINE RESULT: NEGATIVE — SIGNIFICANT CHANGE UP
COCAINE METAB.OTHER UR-MCNC: NEGATIVE — SIGNIFICANT CHANGE UP
CREAT ?TM UR-MCNC: 29 MG/DL — SIGNIFICANT CHANGE UP
DIR ANTIGLOB POLYSPECIFIC INTERPRETATION: SIGNIFICANT CHANGE UP
FENTANYL UR QL: NEGATIVE — SIGNIFICANT CHANGE UP
GLUCOSE BLDC GLUCOMTR-MCNC: 86 MG/DL — SIGNIFICANT CHANGE UP (ref 70–99)
GLUCOSE BLDC GLUCOMTR-MCNC: 88 MG/DL — SIGNIFICANT CHANGE UP (ref 70–99)
L&D DRUG SCREEN, URINE: SIGNIFICANT CHANGE UP
METHADONE UR-MCNC: NEGATIVE — SIGNIFICANT CHANGE UP
OPIATES UR-MCNC: NEGATIVE — SIGNIFICANT CHANGE UP
OXYCODONE UR-MCNC: NEGATIVE — SIGNIFICANT CHANGE UP
PCP UR-MCNC: NEGATIVE — SIGNIFICANT CHANGE UP
PROPOXYPHENE QUALITATIVE URINE RESULT: NEGATIVE — SIGNIFICANT CHANGE UP
PROT ?TM UR-MCNC: <5 MG/DLG/24H — SIGNIFICANT CHANGE UP
PROT/CREAT UR-RTO: <0.2 RATIO — SIGNIFICANT CHANGE UP (ref 0–0.2)

## 2023-08-02 PROCEDURE — 59409 OBSTETRICAL CARE: CPT | Mod: U7

## 2023-08-02 RX ORDER — ACETAMINOPHEN 500 MG
975 TABLET ORAL
Refills: 0 | Status: DISCONTINUED | OUTPATIENT
Start: 2023-08-02 | End: 2023-08-03

## 2023-08-02 RX ORDER — LANOLIN
1 OINTMENT (GRAM) TOPICAL EVERY 6 HOURS
Refills: 0 | Status: DISCONTINUED | OUTPATIENT
Start: 2023-08-02 | End: 2023-08-03

## 2023-08-02 RX ORDER — HYDROCORTISONE 1 %
1 OINTMENT (GRAM) TOPICAL EVERY 6 HOURS
Refills: 0 | Status: DISCONTINUED | OUTPATIENT
Start: 2023-08-02 | End: 2023-08-03

## 2023-08-02 RX ORDER — SODIUM CHLORIDE 9 MG/ML
3 INJECTION INTRAMUSCULAR; INTRAVENOUS; SUBCUTANEOUS EVERY 8 HOURS
Refills: 0 | Status: DISCONTINUED | OUTPATIENT
Start: 2023-08-02 | End: 2023-08-03

## 2023-08-02 RX ORDER — OXYCODONE HYDROCHLORIDE 5 MG/1
5 TABLET ORAL
Refills: 0 | Status: DISCONTINUED | OUTPATIENT
Start: 2023-08-02 | End: 2023-08-03

## 2023-08-02 RX ORDER — IBUPROFEN 200 MG
600 TABLET ORAL EVERY 6 HOURS
Refills: 0 | Status: DISCONTINUED | OUTPATIENT
Start: 2023-08-02 | End: 2023-08-03

## 2023-08-02 RX ORDER — OXYTOCIN 10 UNIT/ML
2 VIAL (ML) INJECTION
Qty: 30 | Refills: 0 | Status: DISCONTINUED | OUTPATIENT
Start: 2023-08-02 | End: 2023-08-02

## 2023-08-02 RX ORDER — AER TRAVELER 0.5 G/1
1 SOLUTION RECTAL; TOPICAL EVERY 4 HOURS
Refills: 0 | Status: DISCONTINUED | OUTPATIENT
Start: 2023-08-02 | End: 2023-08-03

## 2023-08-02 RX ORDER — DIPHENHYDRAMINE HCL 50 MG
25 CAPSULE ORAL EVERY 6 HOURS
Refills: 0 | Status: DISCONTINUED | OUTPATIENT
Start: 2023-08-02 | End: 2023-08-03

## 2023-08-02 RX ORDER — MAGNESIUM HYDROXIDE 400 MG/1
30 TABLET, CHEWABLE ORAL
Refills: 0 | Status: DISCONTINUED | OUTPATIENT
Start: 2023-08-02 | End: 2023-08-03

## 2023-08-02 RX ORDER — KETOROLAC TROMETHAMINE 30 MG/ML
30 SYRINGE (ML) INJECTION ONCE
Refills: 0 | Status: DISCONTINUED | OUTPATIENT
Start: 2023-08-02 | End: 2023-08-02

## 2023-08-02 RX ORDER — NALOXONE HYDROCHLORIDE 4 MG/.1ML
0.1 SPRAY NASAL
Refills: 0 | Status: DISCONTINUED | OUTPATIENT
Start: 2023-08-02 | End: 2023-08-03

## 2023-08-02 RX ORDER — ONDANSETRON 8 MG/1
4 TABLET, FILM COATED ORAL EVERY 6 HOURS
Refills: 0 | Status: DISCONTINUED | OUTPATIENT
Start: 2023-08-02 | End: 2023-08-03

## 2023-08-02 RX ORDER — OXYTOCIN 10 UNIT/ML
333.33 VIAL (ML) INJECTION
Qty: 20 | Refills: 0 | Status: DISCONTINUED | OUTPATIENT
Start: 2023-08-02 | End: 2023-08-03

## 2023-08-02 RX ORDER — DIBUCAINE 1 %
1 OINTMENT (GRAM) RECTAL EVERY 6 HOURS
Refills: 0 | Status: DISCONTINUED | OUTPATIENT
Start: 2023-08-02 | End: 2023-08-03

## 2023-08-02 RX ORDER — IBUPROFEN 200 MG
600 TABLET ORAL EVERY 6 HOURS
Refills: 0 | Status: COMPLETED | OUTPATIENT
Start: 2023-08-02 | End: 2024-06-30

## 2023-08-02 RX ORDER — TETANUS TOXOID, REDUCED DIPHTHERIA TOXOID AND ACELLULAR PERTUSSIS VACCINE, ADSORBED 5; 2.5; 8; 8; 2.5 [IU]/.5ML; [IU]/.5ML; UG/.5ML; UG/.5ML; UG/.5ML
0.5 SUSPENSION INTRAMUSCULAR ONCE
Refills: 0 | Status: DISCONTINUED | OUTPATIENT
Start: 2023-08-02 | End: 2023-08-03

## 2023-08-02 RX ORDER — FENTANYL/BUPIVACAINE/NS/PF 2MCG/ML-.1
250 PLASTIC BAG, INJECTION (ML) INJECTION
Refills: 0 | Status: DISCONTINUED | OUTPATIENT
Start: 2023-08-02 | End: 2023-08-03

## 2023-08-02 RX ORDER — SIMETHICONE 80 MG/1
80 TABLET, CHEWABLE ORAL EVERY 4 HOURS
Refills: 0 | Status: DISCONTINUED | OUTPATIENT
Start: 2023-08-02 | End: 2023-08-03

## 2023-08-02 RX ORDER — PRAMOXINE HYDROCHLORIDE 150 MG/15G
1 AEROSOL, FOAM RECTAL EVERY 4 HOURS
Refills: 0 | Status: DISCONTINUED | OUTPATIENT
Start: 2023-08-02 | End: 2023-08-03

## 2023-08-02 RX ORDER — OXYCODONE HYDROCHLORIDE 5 MG/1
5 TABLET ORAL ONCE
Refills: 0 | Status: DISCONTINUED | OUTPATIENT
Start: 2023-08-02 | End: 2023-08-03

## 2023-08-02 RX ORDER — DEXAMETHASONE 0.5 MG/5ML
4 ELIXIR ORAL EVERY 6 HOURS
Refills: 0 | Status: DISCONTINUED | OUTPATIENT
Start: 2023-08-02 | End: 2023-08-03

## 2023-08-02 RX ORDER — BENZOCAINE 10 %
1 GEL (GRAM) MUCOUS MEMBRANE EVERY 6 HOURS
Refills: 0 | Status: DISCONTINUED | OUTPATIENT
Start: 2023-08-02 | End: 2023-08-03

## 2023-08-02 RX ADMIN — Medication 2 MILLIUNIT(S)/MIN: at 05:06

## 2023-08-02 RX ADMIN — Medication 30 MILLIGRAM(S): at 09:33

## 2023-08-02 RX ADMIN — Medication 600 MILLIGRAM(S): at 23:43

## 2023-08-02 RX ADMIN — SODIUM CHLORIDE 3 MILLILITER(S): 9 INJECTION INTRAMUSCULAR; INTRAVENOUS; SUBCUTANEOUS at 14:00

## 2023-08-02 RX ADMIN — Medication 600 MILLIGRAM(S): at 17:54

## 2023-08-02 RX ADMIN — Medication 600 MILLIGRAM(S): at 18:24

## 2023-08-02 RX ADMIN — Medication 975 MILLIGRAM(S): at 20:28

## 2023-08-02 RX ADMIN — SODIUM CHLORIDE 3 MILLILITER(S): 9 INJECTION INTRAMUSCULAR; INTRAVENOUS; SUBCUTANEOUS at 21:19

## 2023-08-02 RX ADMIN — Medication 975 MILLIGRAM(S): at 21:19

## 2023-08-02 NOTE — PROGRESS NOTE ADULT - ASSESSMENT
A/P:   48y  at 37w0d, GBS ?, on cytotec, in labor progressing well.  -Continue current management   -Pain management prn  -Continuous EFM/toco  -IV fluid hydration, CLD  -Reevaluate     Dr. Morales and Dr. Mehta aware.  A/P:   48y  at 37w0d, GBS unknown, on cytotec, in labor progressing well.  -Continue current management   -Pain management prn  -Continuous EFM/toco  -IV fluid hydration, CLD  -Reevaluate     Dr. Morales and Dr. Mehta aware.

## 2023-08-02 NOTE — OB PROVIDER DELIVERY SUMMARY - NSSELHIDDEN_OBGYN_ALL_OB_FT
[NS_[email or web address]_ALL_OB_FT:[id number]],[NS_DeliveryRN_OBGYN_ALL_OB_FT:[id number]],[NS_DeliveryAssist1_OBGYN_ALL_OB_FT:[id number]]

## 2023-08-02 NOTE — PROCEDURE NOTE - NSLOADDOSE_OBGYN_ALL_OB
10 ML of 0.25 percent Bupivicaine/100 Micrograms Fentanyl 15mcg fentanyl given via spinal, 85 mcg fentanyl given via epidural/10 ML of 0.25 percent Bupivicaine/100 Micrograms Fentanyl

## 2023-08-02 NOTE — PROCEDURE NOTE - ADDITIONAL PROCEDURE DETAILS
Thorough discussion of patient's history was had with patient. Discussed risks of epidural/spinal, including PDPH, inadequate analgesia occasionally requiring epidural catheter replacement/general anesthesia, bleeding, infection, spinal cord injury, hypotension, and nausea. Patient expressed understanding of these risks, signed informed consent, and wishes to proceed with the procedure.    Patient sitting. Lumbar epidural performed. Standard ASA monitors including FHR used. Sterile gloves, Chloraprep used. 1% lidocaine was used for local infiltration. 17g Tuohy used to achieve YUSEF at 8 cm. 27g Jimi used to make a dural puncture for CSF confirmation. Jimi needle removed easily. Catheter secured in place at 13 cm. Tuohy needle removed. Negative aspiration. Test dose consisting of 3cc 1.5% lidocaine with epinephrine was negative. Patient tolerated procedure and was hemodynamically stable throughout. Thorough discussion of patient's history was had with patient. Discussed risks of epidural/spinal, including PDPH, inadequate analgesia occasionally requiring epidural catheter replacement/general anesthesia, bleeding, infection, spinal cord injury, hypotension, and nausea. Patient expressed understanding of these risks, signed informed consent, and wishes to proceed with the procedure.    Patient sitting. Lumbar epidural performed. Standard ASA monitors including FHR used. Sterile gloves, Chloraprep used. 1% lidocaine was used for local infiltration. 17g Tuohy used to achieve YUSEF at 8 cm. 27g Jimi used to make a dural puncture for CSF confirmation. Jimi needle removed easily. Catheter secured in place at 13 cm. Tuohy needle removed. Negative aspiration. Test dose consisting of 3cc 1.5% lidocaine with epinephrine was negative. Patient tolerated procedure and was hemodynamically stable throughout.    Post Labor  Epidural/ Delivery  Evaluation Note:    Uncomplicated anesthetic for Vaginal Delivery.    Patient seen at bedside. Epidural to be removed by RN before patient transfer.  Patient moving B/L lower extremities.  Motor block appropriate and resolving. Vital Signs are stable. Pain well controlled.     Gallo Score greater than 9    Mental Status:  __x__ Awake   ___x__ Alert   _____ Drowsy   _____ Sedated    Nausea/Vomiting:  __x__ NO  ______Yes,   See Post - Op Orders          Pain Scale (0-10):  ___0__    Treatment: __X__ None       Plan: Discharge:   ____Home       __X___Floor     _____Critical Care    _____

## 2023-08-02 NOTE — OB PROVIDER DELIVERY SUMMARY - NSPROVIDERDELIVERYNOTE_OBGYN_ALL_OB_FT
Patient fully dilated and pushing under epidural anesthesia. Fetal head MARIBELL and restituted to ROT. Loose nuchal cord noted x 1. The anterior and posterior shoulders delivered followed by body atraumatically. Delayed cord clamping performed. Cord clamed and cut and baby handed to mother. Cord blood collected. Placenta delivered intact. Pitocin administered. Cervix and vagina inspected. 2nd degree laceration noted and repaired with 2.0 chromic in usual fashion with good hemostasis. Patient fully dilated and pushing under epidural anesthesia. Fetal head MARIBELL and restituted to ROT. Loose nuchal cord noted x 1. The anterior and posterior shoulders delivered followed by body atraumatically. Delayed cord clamping performed. Cord clamed and cut and baby handed to mother. Cord blood collected. Placenta delivered intact. Pitocin administered. Cervix and vagina inspected. 2nd degree laceration noted and repaired with 2.0 chromic in usual fashion with good hemostasis.    Female, Apgar 9/9

## 2023-08-02 NOTE — OB RN DELIVERY SUMMARY - NSSELHIDDEN_OBGYN_ALL_OB_FT
[NS_[email or web address]_ALL_OB_FT:[id number]],[NS_DeliveryRN_OBGYN_ALL_OB_FT:[id number]]

## 2023-08-02 NOTE — PROGRESS NOTE ADULT - SUBJECTIVE AND OBJECTIVE BOX
PGY1 Note    Patient seen at bedside for labor progression. No complaints at the moment.    T(F): 98.2 ( @ 20:48), Max: 98.24 ( @ 20:30)  HR: 68 ( @ 03:08)  BP: 130/77 ( @ 03:01) (109/61 - 168/75)  RR: 18 ( @ 01:11)  EFM: 130bpm/ moderate variability +accels   TOCO: q 3 minutes  SVE: last exam 2/0/-3    Medications:  lactated ringers.: 125 ( @ 20:36)  misoprostol: 25 ( @ 21:33)  oxytocin Infusion.: 2 ( @ 01:05)      Labs:                        12.8   9.56  )-----------( 266      ( 01 Aug 2023 21:40 )             37.4         138  |  104  |  9<L>  ----------------------------<  101<H>  4.1   |  20  |  0.8    Ca    9.4      01 Aug 2023 21:40    TPro  6.5  /  Alb  3.7  /  TBili  <0.2  /  DBili  x   /  AST  10  /  ALT  8   /  AlkPhos  115      Rapid HIV-1/2 Antibody: Nonreact ( @ 21:57)  Prenatal Syphilis Test: Nonreact ( @ 21:40)  Uric Acid: 5.6 mg/dL ( @ 21:40)  Antibody Screen: POS (23 @ 21:22)  Antibody Interpretation 1: Specificity Not Determined (23 @ 21:22)    Urinalysis Basic - ( 01 Aug 2023 21:40 )    Color: Yellow / Appearance: Clear / S.005 / pH: x  Gluc: 101 mg/dL / Ketone: Negative mg/dL  / Bili: Negative / Urobili: 0.2 mg/dL   Blood: x / Protein: Negative mg/dL / Nitrite: Negative   Leuk Esterase: Negative / RBC: x / WBC x   Sq Epi: x / Non Sq Epi: x / Bacteria: x

## 2023-08-02 NOTE — OB PROVIDER LABOR PROGRESS NOTE - ASSESSMENT
48y  at 37w1d, GBS unknown, IOL for gHTN and GDMA1, on pitocin, in labor progressing well.  -C/w pitocin  -Pain management w/ epidural  -Continuous EFM/toco  -IV fluid hydration, CLD    Dr. Morales aware.

## 2023-08-02 NOTE — PROGRESS NOTE ADULT - SUBJECTIVE AND OBJECTIVE BOX
PGY1 Note    Patient seen at bedside for labor progression. No complaints at the moment.    T(F): 98.2 ( @ 20:48), Max: 98.24 ( @ 20:30)  HR: 75 ( @ 00:38)  BP: 132/79 ( @ 00:38) (109/61 - 146/77)  RR: 18 ( @ 20:48)  EFM: 130bpm/ moderate variability +accels  TOCO: irregularly   SVE: 2/0/-3 Exam by Dr. Pearles    Medications:  lactated ringers.: 125 ( @ 20:36)  misoprostol: 25 ( @ 21:33)      Labs:                        12.8   9.56  )-----------( 266      ( 01 Aug 2023 21:40 )             37.4         138  |  104  |  9<L>  ----------------------------<  101<H>  4.1   |  20  |  0.8    Ca    9.4      01 Aug 2023 21:40    TPro  6.5  /  Alb  3.7  /  TBili  <0.2  /  DBili  x   /  AST  10  /  ALT  8   /  AlkPhos  115      Rapid HIV-1/2 Antibody: Nonreact ( @ 21:57)  Prenatal Syphilis Test: Nonreact ( @ 21:40)  Uric Acid: 5.6 mg/dL ( @ 21:40)  Antibody Screen: POS (23 @ 21:22)    Urinalysis Basic - ( 01 Aug 2023 21:40 )    Color: Yellow / Appearance: Clear / S.005 / pH: x  Gluc: 101 mg/dL / Ketone: Negative mg/dL  / Bili: Negative / Urobili: 0.2 mg/dL   Blood: x / Protein: Negative mg/dL / Nitrite: Negative   Leuk Esterase: Negative / RBC: x / WBC x   Sq Epi: x / Non Sq Epi: x / Bacteria: x             PGY1 Note    Patient seen at bedside for labor progression. No complaints at the moment.    T(F): 98.2 ( @ 20:48), Max: 98.24 ( @ 20:30)  HR: 75 ( @ 00:38)  BP: 132/79 ( @ 00:38) (109/61 - 146/77)  RR: 18 ( @ 20:48)  EFM: 130bpm/ moderate variability +accels  TOCO: irregularly   SVE: 2/0/-3 Exam by Dr. Perales    Medications:  lactated ringers.: 125 ( @ 20:36)  misoprostol: 25 ( @ 21:33)      Labs:                        12.8   9.56  )-----------( 266      ( 01 Aug 2023 21:40 )             37.4         138  |  104  |  9<L>  ----------------------------<  101<H>  4.1   |  20  |  0.8    Ca    9.4      01 Aug 2023 21:40    TPro  6.5  /  Alb  3.7  /  TBili  <0.2  /  DBili  x   /  AST  10  /  ALT  8   /  AlkPhos  115      Rapid HIV-1/2 Antibody: Nonreact ( @ 21:57)  Prenatal Syphilis Test: Nonreact ( @ 21:40)  Uric Acid: 5.6 mg/dL ( @ 21:40)  Antibody Screen: POS (23 @ 21:22)    Urinalysis Basic - ( 01 Aug 2023 21:40 )    Color: Yellow / Appearance: Clear / S.005 / pH: x  Gluc: 101 mg/dL / Ketone: Negative mg/dL  / Bili: Negative / Urobili: 0.2 mg/dL   Blood: x / Protein: Negative mg/dL / Nitrite: Negative   Leuk Esterase: Negative / RBC: x / WBC x   Sq Epi: x / Non Sq Epi: x / Bacteria: x

## 2023-08-02 NOTE — PROGRESS NOTE ADULT - ASSESSMENT
A/P:   48y  at 37w0d, GBS unknown, on pitocin, in labor progressing well.  -Continue current management , Pitocin currently at 4mu/min  -Pain management prn  -Continuous EFM/toco  -IV fluid hydration, CLD  -Reevaluate     Dr. Morales and Dr. Mehta aware.    A/P:   48y  at 37w1d, GBS unknown, on pitocin, in labor progressing well.  -Continue current management , Pitocin currently at 4mu/min  -Pain management prn  -Continuous EFM/toco  -IV fluid hydration, CLD  -Reevaluate     Dr. Morales and Dr. Mehta aware.

## 2023-08-02 NOTE — PRE-ANESTHESIA EVALUATION ADULT - NSANTHPMHFT_GEN_ALL_CORE
49 y/o  at 37w0d, LEBRON 2023, dated by first trimester sonogram, presents for induction of labor for gHTN. Pregnancy has also been complicated with GDMA1, FS well controlled. Pregnancy further complicated with elevated MSAFP with normal anatomy scan, declined amniocentesis. 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 Had and elevated titer 1:1 for anti M, last antibody screen in  was negative. Denies headaches, vision changes, SOB, chest pain, RUQ/epigastric pain. Denies contractions, LOF, or vaginal bleeding. Reports good fetal movement. GBS unknown.

## 2023-08-02 NOTE — ANESTHESIA FOLLOW-UP NOTE - NSEVALATIONFT_GEN_ALL_CORE
Patient concerned with right leg not having fully recovered yet. Patient endorses   strength in left leg is normal. Right leg is recovering slowly. She is able to move toes angle bend knee and hip. Patient re-assured strength will continue improve. She was instructed to notify her nurse immediately if weakness increases and that we will see her tomorrow morning again for follow up.

## 2023-08-03 ENCOUNTER — TRANSCRIPTION ENCOUNTER (OUTPATIENT)
Age: 48
End: 2023-08-03

## 2023-08-03 VITALS
DIASTOLIC BLOOD PRESSURE: 57 MMHG | HEART RATE: 83 BPM | TEMPERATURE: 98 F | SYSTOLIC BLOOD PRESSURE: 109 MMHG | RESPIRATION RATE: 18 BRPM

## 2023-08-03 LAB
BASOPHILS # BLD AUTO: 0.02 K/UL — SIGNIFICANT CHANGE UP (ref 0–0.2)
BASOPHILS NFR BLD AUTO: 0.2 % — SIGNIFICANT CHANGE UP (ref 0–1)
EOSINOPHIL # BLD AUTO: 0.08 K/UL — SIGNIFICANT CHANGE UP (ref 0–0.7)
EOSINOPHIL NFR BLD AUTO: 0.8 % — SIGNIFICANT CHANGE UP (ref 0–8)
HCT VFR BLD CALC: 29.1 % — LOW (ref 37–47)
HGB BLD-MCNC: 10 G/DL — LOW (ref 12–16)
IMM GRANULOCYTES NFR BLD AUTO: 0.7 % — HIGH (ref 0.1–0.3)
LYMPHOCYTES # BLD AUTO: 1.64 K/UL — SIGNIFICANT CHANGE UP (ref 1.2–3.4)
LYMPHOCYTES # BLD AUTO: 17 % — LOW (ref 20.5–51.1)
MCHC RBC-ENTMCNC: 32.3 PG — HIGH (ref 27–31)
MCHC RBC-ENTMCNC: 34.4 G/DL — SIGNIFICANT CHANGE UP (ref 32–37)
MCV RBC AUTO: 93.9 FL — SIGNIFICANT CHANGE UP (ref 81–99)
MONOCYTES # BLD AUTO: 0.65 K/UL — HIGH (ref 0.1–0.6)
MONOCYTES NFR BLD AUTO: 6.7 % — SIGNIFICANT CHANGE UP (ref 1.7–9.3)
NEUTROPHILS # BLD AUTO: 7.21 K/UL — HIGH (ref 1.4–6.5)
NEUTROPHILS NFR BLD AUTO: 74.6 % — SIGNIFICANT CHANGE UP (ref 42.2–75.2)
NRBC # BLD: 0 /100 WBCS — SIGNIFICANT CHANGE UP (ref 0–0)
PLATELET # BLD AUTO: 197 K/UL — SIGNIFICANT CHANGE UP (ref 130–400)
PMV BLD: 11.2 FL — HIGH (ref 7.4–10.4)
RBC # BLD: 3.1 M/UL — LOW (ref 4.2–5.4)
RBC # FLD: 13.4 % — SIGNIFICANT CHANGE UP (ref 11.5–14.5)
WBC # BLD: 9.67 K/UL — SIGNIFICANT CHANGE UP (ref 4.8–10.8)
WBC # FLD AUTO: 9.67 K/UL — SIGNIFICANT CHANGE UP (ref 4.8–10.8)

## 2023-08-03 PROCEDURE — 99238 HOSP IP/OBS DSCHRG MGMT 30/<: CPT

## 2023-08-03 RX ORDER — ACETAMINOPHEN 500 MG
3 TABLET ORAL
Qty: 0 | Refills: 0 | DISCHARGE
Start: 2023-08-03

## 2023-08-03 RX ORDER — MAGNESIUM HYDROXIDE 400 MG/1
30 TABLET, CHEWABLE ORAL
Qty: 0 | Refills: 0 | DISCHARGE
Start: 2023-08-03

## 2023-08-03 RX ORDER — IBUPROFEN 200 MG
1 TABLET ORAL
Qty: 0 | Refills: 0 | DISCHARGE
Start: 2023-08-03

## 2023-08-03 RX ORDER — SIMETHICONE 80 MG/1
1 TABLET, CHEWABLE ORAL
Qty: 0 | Refills: 0 | DISCHARGE
Start: 2023-08-03

## 2023-08-03 RX ADMIN — SODIUM CHLORIDE 3 MILLILITER(S): 9 INJECTION INTRAMUSCULAR; INTRAVENOUS; SUBCUTANEOUS at 07:15

## 2023-08-03 RX ADMIN — Medication 600 MILLIGRAM(S): at 06:13

## 2023-08-03 RX ADMIN — Medication 600 MILLIGRAM(S): at 00:15

## 2023-08-03 RX ADMIN — Medication 975 MILLIGRAM(S): at 09:29

## 2023-08-03 RX ADMIN — Medication 600 MILLIGRAM(S): at 12:39

## 2023-08-03 RX ADMIN — Medication 600 MILLIGRAM(S): at 07:15

## 2023-08-03 NOTE — PROGRESS NOTE ADULT - SUBJECTIVE AND OBJECTIVE BOX
PGY 1 Note:    Pt seen and evaluated at bedside. Pain well controlled. Vaginal bleeding minimal. Denies fever, chills, dizziness/lightheadedness, nausea/vomiting, dysuria, or LE pain.     Ambulating: Yes  Voiding: Yes  Breast or bottle feeding: Breast  Diet: tolerating PO    Physical Exam  Vital Signs Last 24 Hrs  T(C): 36.9 (03 Aug 2023 03:19), Max: 37.0 (02 Aug 2023 07:00)  T(F): 98.5 (03 Aug 2023 03:19), Max: 98.6 (02 Aug 2023 07:00)  HR: 79 (03 Aug 2023 03:19) (68 - 109)  BP: 107/62 (03 Aug 2023 03:19) (91/56 - 167/86)  RR: 18 (03 Aug 2023 03:19) (18 - 18)  SpO2: 97% (02 Aug 2023 10:28) (94% - 99%)      Gen: AAOx3, NAD  Fundus: firm, below umbilicus   Abd: Soft, nontender, nondistended  Lochia: minimal   Ext: No calf tenderness, no swelling    Labs:                        12.8   9.56  )-----------( 266      ( 01 Aug 2023 21:40 )             37.4        MEDICATIONS  (STANDING):  acetaminophen     Tablet .. 975 milliGRAM(s) Oral <User Schedule>  diphtheria/tetanus/pertussis (acellular) Vaccine (Adacel) 0.5 milliLiter(s) IntraMuscular once  fentanyl (2 MICROgram(s)/mL) + bupivacaine 0.0625%  in 0.9% Sodium Chloride PCEA 250 milliLiter(s) Epidural PCA Continuous  ibuprofen  Tablet. 600 milliGRAM(s) Oral every 6 hours  oxytocin Infusion 333.333 milliUNIT(s)/Min (1000 mL/Hr) IV Continuous <Continuous>  oxytocin Infusion 333.333 milliUNIT(s)/Min (1000 mL/Hr) IV Continuous <Continuous>  prenatal multivitamin 1 Tablet(s) Oral daily  sodium chloride 0.9% lock flush 3 milliLiter(s) IV Push every 8 hours    MEDICATIONS  (PRN):  benzocaine 20%/menthol 0.5% Spray 1 Spray(s) Topical every 6 hours PRN for Perineal discomfort  dexAMETHasone  Injectable 4 milliGRAM(s) IV Push every 6 hours PRN Nausea  dibucaine 1% Ointment 1 Application(s) Topical every 6 hours PRN Perineal discomfort  diphenhydrAMINE 25 milliGRAM(s) Oral every 6 hours PRN Pruritus  hydrocortisone 1% Cream 1 Application(s) Topical every 6 hours PRN Moderate Pain (4-6)  lanolin Ointment 1 Application(s) Topical every 6 hours PRN nipple soreness  magnesium hydroxide Suspension 30 milliLiter(s) Oral two times a day PRN Constipation  naloxone Injectable 0.1 milliGRAM(s) IV Push every 3 minutes PRN For ANY of the following changes in patient status:  A. RR LESS THAN 10 breaths per minute, B. Oxygen saturation LESS THAN 90%, C. Sedation score of 6  ondansetron Injectable 4 milliGRAM(s) IV Push every 6 hours PRN Nausea  oxyCODONE    IR 5 milliGRAM(s) Oral every 3 hours PRN Moderate to Severe Pain (4-10)  oxyCODONE    IR 5 milliGRAM(s) Oral once PRN Moderate to Severe Pain (4-10)  pramoxine 1%/zinc 5% Cream 1 Application(s) Topical every 4 hours PRN Moderate Pain (4-6)  simethicone 80 milliGRAM(s) Chew every 4 hours PRN Gas  witch hazel Pads 1 Application(s) Topical every 4 hours PRN Perineal discomfort

## 2023-08-03 NOTE — DISCHARGE NOTE OB - CARE PLAN
1 Principal Discharge DX:	Vaginal delivery  Assessment and plan of treatment:	No heavy lifting.   Nothing inside the vagina for 6 weeks: no tampons, douching, sexual intercourse, tub baths or pools.   If you have a fever over 100.4F, severe abdominal pain or heavy vaginal bleeding please call your doctor or go to the emergency room.    Please follow up with your OBGYN in 6 weeks for a postpartum visit.  Secondary Diagnosis:	Gestational hypertension  Assessment and plan of treatment:	Monitor blood pressures daily with home BP cuff.   If you have headache, blurry vision, chest pain, difficulty breathing, or severe abdominal pain, call the doctor or return to the hospital.   Follow up with your OBGYN in 1 week for a blood pressure check.

## 2023-08-03 NOTE — PROGRESS NOTE ADULT - ASSESSMENT
A/P: 47yo now P5, gHTN, S/P  PPD 1 , recovering well, normotensive   - pain management with PO pain meds   - monitor vitals/bleeding   - encourage PO hydration, ambulation  - regular diet as tolerated   - routine postpartum care   - f/u AM labs and GBS (done outpt)  - Ordered VNS for gHTN  - anticipate d/c home today    Dr. Tan and Dr. Camacho to be made aware A/P: 49yo now P5, gHTN, S/P  PPD 1 , recovering well, normotensive   - pain management with PO pain meds   - monitor vitals/bleeding   - encourage PO hydration, ambulation  - regular diet as tolerated   - routine postpartum care   - f/u AM labs and GBS (done outpt)  - anticipate d/c home today, discharge instructions given, patient has home BP cuff, will f/u outpt in 1 week for BP monitoring    Dr. Tan and Dr. Camacho to be made aware

## 2023-08-03 NOTE — DISCHARGE NOTE OB - PATIENT PORTAL LINK FT
You can access the FollowMyHealth Patient Portal offered by [facility] by registering at the following website: [email or web address]. By joining [employer] [facility]s FollowMyHealth portal, you will also be able to view your health information using other applications (apps) compatible with our system.

## 2023-08-03 NOTE — DISCHARGE NOTE OB - PLAN OF CARE
No heavy lifting.   Nothing inside the vagina for 6 weeks: no tampons, douching, sexual intercourse, tub baths or pools.   If you have a fever over 100.4F, severe abdominal pain or heavy vaginal bleeding please call your doctor or go to the emergency room.    Please follow up with your OBGYN in 6 weeks for a postpartum visit. Monitor blood pressures daily with home BP cuff.   If you have headache, blurry vision, chest pain, difficulty breathing, or severe abdominal pain, call the doctor or return to the hospital.   Follow up with your OBGYN in 1 week for a blood pressure check.

## 2023-08-03 NOTE — DISCHARGE NOTE OB - CARE PROVIDER_API CALL
Mario Camacho  Obstetrics and Gynecology  [address] [place] [id number]  Phone: [phone]  Fax: [phone]  Follow Up Time:

## 2023-08-03 NOTE — DISCHARGE NOTE OB - HOSPITAL COURSE
DATE OF DISCHARGE: 23 @ 06:24    HISTORY OF PRESENT ILLNESS/HOSPITAL COURSE: HPI:  49 y/o  at 37w0d, LEBRON 2023, dated by first trimester sonogram, presents for induction of labor for gHTN. Pregnancy has also been complicated with GDMA1, FS well controlled. Pregnancy further complicated with elevated MSAFP with normal anatomy scan, declined amniocentesis. 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 Had and elevated titer 1:1 for anti M, last antibody screen in  was negative. Denies headaches, vision changes, SOB, chest pain, RUQ/epigastric pain. Denies contractions, LOF, or vaginal bleeding. Reports good fetal movement. GBS unknown. (01 Aug 2023 20:38)    PAST MEDICAL & SURGICAL HISTORY:  No pertinent past medical history  History of hysteroscopy      PROCEDURES PERFORMED: Term spontaneous vaginal delivery  COMPLICATIONS:  gHTN and GDMA1  POST PARTUM COURSE: uncomplicated, discharged home on PPD1  FINAL DIAGNOSIS:  Status post normal spontaneous vaginal delivery at Gestational Age    DISCHARGE INSTRUCTIONS:  If you have severe abdominal pain, heavy vaginal bleeding, shortness of breath or chest pain please call your doctor or come to the emergency room.   DIET:  Advance as tolerated.  ACTIVITY:  Advance as tolerated.  Pelvic rest for 6 weeks.  Nothing to be inserted into the vagina for 6 weeks, i.e. no tampons, douching, sexual relations, or tub baths.   FOLLOW UP: Make an appointment to see your doctor as instructed

## 2023-08-03 NOTE — DISCHARGE NOTE OB - MEDICATION SUMMARY - MEDICATIONS TO TAKE
I will START or STAY ON the medications listed below when I get home from the hospital:    ibuprofen 600 mg oral tablet  -- 1 tab(s) by mouth every 6 hours  -- Indication: For Pain    acetaminophen 325 mg oral tablet  -- 3 tab(s) by mouth every 6 hours  -- Indication: For pain    magnesium hydroxide 8% oral suspension  -- 30 milliliter(s) by mouth 2 times a day As needed Constipation  -- Indication: For  constipation    Prenatal Multivitamins with Folic Acid 1 mg oral tablet  -- 1 tab(s) by mouth once a day  -- Indication: For vitamins    simethicone 80 mg oral tablet, chewable  -- 1 tab(s) by mouth every 4 hours As needed Gas  -- Indication: For gas

## 2023-08-04 ENCOUNTER — NON-APPOINTMENT (OUTPATIENT)
Age: 48
End: 2023-08-04

## 2023-08-04 ENCOUNTER — APPOINTMENT (OUTPATIENT)
Dept: OBGYN | Facility: CLINIC | Age: 48
End: 2023-08-04

## 2023-08-07 ENCOUNTER — NON-APPOINTMENT (OUTPATIENT)
Age: 48
End: 2023-08-07

## 2023-08-08 ENCOUNTER — APPOINTMENT (OUTPATIENT)
Dept: ANTEPARTUM | Facility: CLINIC | Age: 48
End: 2023-08-08

## 2023-08-08 DIAGNOSIS — Z3A.37 37 WEEKS GESTATION OF PREGNANCY: ICD-10-CM

## 2023-08-08 DIAGNOSIS — O28.1 ABNORMAL BIOCHEMICAL FINDING ON ANTENATAL SCREENING OF MOTHER: ICD-10-CM

## 2023-08-08 DIAGNOSIS — Z14.8 GENETIC CARRIER OF OTHER DISEASE: ICD-10-CM

## 2023-08-08 LAB
C TRACH RRNA SPEC QL NAA+PROBE: NOT DETECTED
GP B STREP DNA SPEC QL NAA+PROBE: NOT DETECTED
N GONORRHOEA RRNA SPEC QL NAA+PROBE: NOT DETECTED
SOURCE AMPLIFICATION: NORMAL
SOURCE GBS: NORMAL

## 2023-08-11 ENCOUNTER — APPOINTMENT (OUTPATIENT)
Dept: OBGYN | Facility: CLINIC | Age: 48
End: 2023-08-11

## 2023-08-11 PROBLEM — Z78.9 OTHER SPECIFIED HEALTH STATUS: Chronic | Status: ACTIVE | Noted: 2023-08-01

## 2023-08-15 ENCOUNTER — APPOINTMENT (OUTPATIENT)
Dept: ANTEPARTUM | Facility: CLINIC | Age: 48
End: 2023-08-15

## 2023-08-18 ENCOUNTER — APPOINTMENT (OUTPATIENT)
Dept: OBGYN | Facility: CLINIC | Age: 48
End: 2023-08-18

## 2023-08-22 ENCOUNTER — APPOINTMENT (OUTPATIENT)
Dept: ANTEPARTUM | Facility: CLINIC | Age: 48
End: 2023-08-22

## 2023-08-25 ENCOUNTER — APPOINTMENT (OUTPATIENT)
Dept: OBGYN | Facility: CLINIC | Age: 48
End: 2023-08-25

## 2023-08-25 NOTE — DISCHARGE NOTE OB - KEGEL (VAGINAL TIGHTENING) EXERCISES TO PROMOTE HEALING
Dr. Derrek Medina please advise if you are able to fill this for patient or not.
NOV: 8/29/23      PDMP checked
Sorry patient was asking for a refill on his Ambien, we have never filled it. I see you have filled it the last few times so I sent it to you.
Sorry, I didn't understand this.
The patient will [facility] at [facility].
Statement Selected

## 2023-12-04 NOTE — OB PROVIDER H&P - NSHOSPITALIZATION_OBGYN_ALL_OB
Last Visit: 07/19/2023  Next Visit: 12/14/2023    Received request via: Pharmacy    Was the patient seen in the last year in this department? Yes    Does the patient have an active prescription (recently filled or refills available) for medication(s) requested? No   
No
